# Patient Record
Sex: MALE | Race: WHITE | NOT HISPANIC OR LATINO | Employment: OTHER | URBAN - METROPOLITAN AREA
[De-identification: names, ages, dates, MRNs, and addresses within clinical notes are randomized per-mention and may not be internally consistent; named-entity substitution may affect disease eponyms.]

---

## 2018-01-06 ENCOUNTER — APPOINTMENT (OUTPATIENT)
Dept: GENERAL RADIOLOGY | Facility: CLINIC | Age: 73
End: 2018-01-06
Attending: PHYSICIAN ASSISTANT
Payer: MEDICARE

## 2018-01-06 ENCOUNTER — HOSPITAL ENCOUNTER (EMERGENCY)
Facility: CLINIC | Age: 73
Discharge: HOME | End: 2018-01-06
Attending: PHYSICIAN ASSISTANT | Admitting: PHYSICIAN ASSISTANT
Payer: MEDICARE

## 2018-01-06 VITALS
WEIGHT: 195 LBS | BODY MASS INDEX: 27.92 KG/M2 | SYSTOLIC BLOOD PRESSURE: 149 MMHG | TEMPERATURE: 98.2 F | DIASTOLIC BLOOD PRESSURE: 77 MMHG | OXYGEN SATURATION: 98 % | HEIGHT: 70 IN | RESPIRATION RATE: 18 BRPM | HEART RATE: 58 BPM

## 2018-01-06 DIAGNOSIS — S99.922A SOFT TISSUE INJURY OF LEFT FOOT, INITIAL ENCOUNTER: ICD-10-CM

## 2018-01-06 PROCEDURE — 73630 X-RAY EXAM OF FOOT: CPT | Mod: LT

## 2018-01-06 PROCEDURE — 99283 EMERGENCY DEPT VISIT LOW MDM: CPT

## 2018-01-06 PROCEDURE — 25000132 ZZH RX MED GY IP 250 OP 250 PS 637: Performed by: PHYSICIAN ASSISTANT

## 2018-01-06 RX ORDER — HYDROCODONE BITARTRATE AND ACETAMINOPHEN 5; 325 MG/1; MG/1
2 TABLET ORAL ONCE
Status: COMPLETED | OUTPATIENT
Start: 2018-01-06 | End: 2018-01-06

## 2018-01-06 RX ORDER — HYDROCHLOROTHIAZIDE 12.5 MG/1
12.5 CAPSULE ORAL DAILY
COMMUNITY

## 2018-01-06 RX ORDER — HYDROCODONE BITARTRATE AND ACETAMINOPHEN 5; 325 MG/1; MG/1
1-2 TABLET ORAL EVERY 4 HOURS PRN
Qty: 20 TABLET | Refills: 0 | Status: SHIPPED | OUTPATIENT
Start: 2018-01-06

## 2018-01-06 RX ADMIN — HYDROCODONE BITARTRATE AND ACETAMINOPHEN 2 TABLET: 5; 325 TABLET ORAL at 17:57

## 2018-01-06 ASSESSMENT — ENCOUNTER SYMPTOMS: COLOR CHANGE: 0

## 2018-01-06 NOTE — ED NOTES
Pain to top of left foot that began this am.  NO trauma.  No redness/swelling.  Patient alert and oriented x3.  Airway, breathing and circulation intact.

## 2018-01-06 NOTE — ED AVS SNAPSHOT
[facility] Emergency Department    [address]    [facility] [address]    Phone:  [phone]    Fax:  [phone]                                       Alex York   MRN: [id number]    Department:  [facility] Emergency Department   Date of Visit:  1/6/2018           After Visit Summary Signature Page     I have received my discharge instructions, and my questions have been answered. I have discussed any challenges I see with this plan with the nurse or doctor.    ..........................................................................................................................................  Patient/Patient Representative Signature      ..........................................................................................................................................  Patient Representative Print Name and Relationship to Patient    ..................................................               ................................................  Date                                            Time    ..........................................................................................................................................  Reviewed by Signature/Title    ...................................................              ..............................................  Date                                                            Time

## 2018-01-06 NOTE — ED AVS SNAPSHOT
[facility] Emergency Department    [address]    [facility] [address]    Phone:  [phone]    Fax:  [phone]                                       Alex York   MRN: [id number]    Department:  [facility] Emergency Department   Date of Visit:  1/6/2018           Patient Information     Date Of Birth          1945        Your diagnoses for this visit were:     Soft tissue injury of left foot, initial encounter        You were seen by Cindy Anne PA-C.      Follow-up Information     Follow up with [facility] In 1 week.    Specialties:  Sports Medicine, Pain Management, Obstetrics & Gynecology, Pediatrics, Internal Medicine, Nephrology    Why:  As needed    Contact information:    [address]  [facility] [address] [phone]        Follow up with [facility] In 1 week.    Why:  As needed    Contact information:    [address] [place] [address], [facility] [address]  [facility] [address]  [phone]          Follow up with [facility] Emergency Department.    Specialty:  EMERGENCY MEDICINE    Why:  If symptoms worsen    Contact information:    [address] [facility] [id number] [phone]        Discharge Instructions         Treating Strains and Sprains  Strains and sprains happen when muscles or other soft tissues near your bones stretch or tear. These injuries can cause bruising, swelling, and pain. To ease your discomfort and speed the healing of your strain or sprain, follow the tips below. Remember, a strain or sprain can take 6 to 8 weeks to heal.     Important Note: Do not give aspirin to children or teens without discussing it with your healthcare provider first.        Ice first, heat later    Use ice for the first 24 to 48 hours after injury. Ice helps prevent swelling and reduce pain. Ice the injury for no more than 20 minutes at a time and allow  at least  20 minutes between icing sessions.    Apply heat after the first 72 hours, once the swelling has gone down. Heat relaxes muscles and increases blood flow. Soak the injured area in warm water or use a heating pad set on low for no more than 15 minutes at a time.  Wrap and elevate    Wrap an injured limb firmly with an elastic bandage. This provides support and helps prevent swelling. Don t wear an elastic bandage overnight. Watch for tingling, numbness, or increased pain, and remove the bandage immediately if any of these occurs.    Elevate the injured area to help reduce swelling and throbbing. It s best to raise an injured limb above the level of your heart.     Medicines    Over-the-counter medicines such as acetaminophen or ibuprofen can help reduce pain. Some also help reduce swelling.    Take medicine only as directed.    Rest the area even if medicines are controlling the pain.  Rest    Rest the injured area by not using it for 24 hours.    When you re ready, return slowly to your normal activities. Rest the injured area often.    Don t use or walk on an injured limb if it hurts.  Date Last Reviewed: 9/3/2015    2494-9526 [employer] [address] [place] [address]. All rights reserved. This information is not intended as a substitute for professional medical care. Always follow your healthcare professional's instructions.          24 Hour Appointment Hotline       To make an appointment at any [facility], call [phone] ([phone]). If you don't have a family doctor or clinic, we will help you find one. [facility] clinics are conveniently located to serve the needs of you and your family.             Review of your medicines      START taking        Dose / Directions Last dose taken    HYDROcodone-acetaminophen 5-325 MG per tablet   Commonly known as:  NORCO   Dose:  1-2 tablet   Quantity:  20 tablet        Take 1-2 tablets by mouth every 4 hours as needed for  moderate to severe pain   Refills:  0          Our records show that you are taking the medicines listed below. If these are incorrect, please call your family doctor or clinic.        Dose / Directions Last dose taken    ASPIRIN PO   Dose:  81 mg        Take 81 mg by mouth   Refills:  0        ATENOLOL        Refills:  0        hydrochlorothiazide 12.5 MG capsule   Commonly known as:  MICROZIDE   Dose:  12.5 mg        Take 12.5 mg by mouth daily   Refills:  0        SIMVASTATIN PO        Refills:  0        TRAZODONE HCL PO        Refills:  0                Prescriptions were sent or printed at these locations (1 Prescription)                   Other Prescriptions                Printed at Department/Unit printer (1 of 1)         HYDROcodone-acetaminophen (NORCO) 5-325 MG per tablet                Procedures and tests performed during your visit     Foot XR, G/E 3 views, left      Orders Needing Specimen Collection     None      Pending Results     Date and Time Order Name Status Description    1/6/2018 1755 Foot XR, G/E 3 views, left Preliminary             Pending Culture Results     No orders found from 1/4/2018 to 1/7/2018.            Pending Results Instructions     If you had any lab results that were not finalized at the time of your Discharge, you can call the ED Lab Result RN at [phone]. You will be contacted by this team for any positive Lab results or changes in treatment. The nurses are available 7 days a week from 10A to 6:30P.  You can leave a message 24 hours per day and they will return your call.        Test Results From Your Hospital Stay        1/6/2018  6:26 PM      Narrative     LEFT FOOT THREE OR MORE VIEWS 1/6/2018 6:23 PM     COMPARISON: None.    HISTORY: Pain starting today over 1-2 metatarsals.     FINDINGS: The visualized bones and joint spaces are within normal  limits.        Impression     IMPRESSION: No evidence for fracture, dislocation or significant  degenerative change of the  left foot.                 Clinical Quality Measure: Blood Pressure Screening     Your blood pressure was checked while you were in the emergency department today. The last reading we obtained was  BP: 149/77 . Please read the guidelines below about what these numbers mean and what you should do about them.  If your systolic blood pressure (the top number) is less than 120 and your diastolic blood pressure (the bottom number) is less than 80, then your blood pressure is normal. There is nothing more that you need to do about it.  If your systolic blood pressure (the top number) is 120-139 or your diastolic blood pressure (the bottom number) is 80-89, your blood pressure may be higher than it should be. You should have your blood pressure rechecked within a year by a primary care provider.  If your systolic blood pressure (the top number) is 140 or greater or your diastolic blood pressure (the bottom number) is 90 or greater, you may have high blood pressure. High blood pressure is treatable, but if left untreated over time it can put you at risk for heart attack, stroke, or kidney failure. You should have your blood pressure rechecked by a primary care provider within the next 4 weeks.  If your provider in the emergency department today gave you specific instructions to follow-up with your doctor or provider even sooner than that, you should follow that instruction and not wait for up to 4 weeks for your follow-up visit.        Thank you for choosing [facility]       Thank you for choosing [facility] for your care. Our goal is always to provide you with excellent care. Hearing back from our patients is one way we can continue to improve our services. Please take a few minutes to complete the written survey that you may receive in the mail after you visit with us. Thank you!        [employer] Information     [employer] lets you send messages to your doctor, view your test results, renew your prescriptions, schedule  "appointments and more. To sign up, go to [email or web address] . Click on \"Log in\" on the left side of the screen, which will take you to the Welcome page. Then click on \"Sign up Now\" on the right side of the page.     You will be asked to enter the access code listed below, as well as some personal information. Please follow the directions to create your username and password.     Your access code is: [email or web address]  Expires: 2018  7:03 PM     Your access code will  in 90 days. If you need help or a new code, please call your [facility] clinic or [phone].        Care EveryWhere ID     This is your Care EveryWhere ID. This could be used by other organizations to access your [facility] medical records  [id number]        Equal Access to Services     DANA VALENCIA : Suhail Story, harleen al, celena woods, agnieszka katz. So [facility] [phone].    ATENCIÓN: Si habla español, tiene a melendrez disposición servicios gratuitos de asistencia lingüística. Llame al [phone].    We comply with applicable federal civil rights laws and [place] laws. We do not discriminate on the basis of race, color, national origin, age, disability, sex, sexual orientation, or gender identity.            After Visit Summary       This is your record. Keep this with you and show to your community pharmacist(s) and doctor(s) at your next visit.                  "

## 2018-01-06 NOTE — ED PROVIDER NOTES
"  History     Chief Complaint:  Foot Pain    HPI   Alex York is a 73 year old male who presents to the emergency department today for evaluation of left foot pain and is accompanied by his wife and daughter. The patient reports pain to the top of his left foot as of 1000 this morning that has progressively worsened throughout the day. He denies any injury to the area, but has been remodeling a bathroom at home with repetitive stepping movements up and down a step stool. He is normally able to walk without any problems at baseline, but is unable to walk well now due to pain. He reports some swelling to the area, but no redness, warmth, ankle pain, calf pain, or knee pain. He took 500 mg of Tylenol at home.    Allergies:  Percocet    Medications:    Aspirin  Hydrochlorothiazide  Trazodone  Simvastatin  Atenolol    Past Medical History:    Hypercholesteremia  Hypertension  PTSD    Past Surgical History:    Hip surgery  Back surgery x2    Family History:    History reviewed. No pertinent family history.     Social History:  The patient was accompanied to the ED by his wife and daughter.  Smoking Status: Never smoker  Alcohol Use: Yes  Marital Status:   [2]    Review of Systems   Musculoskeletal:        Left foot pain, swelling. No calf or knee pain.   Skin: Negative for color change.   All other systems reviewed and are negative.    Physical Exam   First Vitals:  BP: 149/77  Pulse: 58  Temp: 98.2  F (36.8  C)  Resp: 18  Height: 177.8 cm (5' 10\")  Weight: 88.5 kg (195 lb)  SpO2: 98 %    Physical Exam  Constitutional: Alert, attentive   CV: 2+ DP and PT pulses, brisk distal cap refill  MSK: There is slight swelling with tenderness to the left foot over the dorsal and volar region of the 2nd metatarsal. Dorsi and plantar flexion cause slight exacerbation of pain. No redness or warmth to area of tenderness. No pain on palpation of the left ankle, toes, lower leg.   Neurological: 5/5 strength to the DF, PF, EHL " and FHL motor functions; sensation intact to the DP, SP, T, S and S distributions  Skin: Skin is warm and dry. No bruising.     Emergency Department Course     Imaging:  Radiology findings were communicated with the patient and family who voiced understanding of the findings.    Foot XR, G/E 3 views, left  No evidence for fracture, dislocation, or significant degenerative change of the left foot.  Reading per radiology    Interventions:  1757 Norco 5-325 2 tablets PO    Emergency Department Course:  Nursing notes and vitals reviewed.  1746: I performed an exam of the patient as documented above.   The patient was sent for a Foot XR, G/E 3 views, left while in the emergency department, results above.   1835: I rechecked the patient and updated him and his family on the results of imaging studies. I discussed the treatment plan with the patient. he expressed understanding of this plan and consented to discharge. He will be discharged home with instructions for care and follow up. In addition, the patient will return to the emergency department if their symptoms worsen, if new symptoms arise or if there is any concern.  All questions were answered prior to discharge.    Impression & Plan      Medical Decision Making:  Alex York is a 73 year old male presents for evaluation of left foot pain over the dorsal and volar region of the 2nd metatarsal.  Signs and symptoms are consistent with a soft tissue injury likely from repetitive movements the past few days while going up and down a step stool working on remodeling the bathroom.  A broad differential was considered including sprain, strain, fracture, tendon rupture, nerve impingement/compromise, referred pain.  X-ray of the foot is negative for fracture, dislocation, or significant degenerative changes of the left foot. Supportive outpatient management is therefore indicated.  He was placed in a cam walker boot, and rest, ice, and elevation treatment was discussed  with the patient. No redness or warmth to the foot or ankle concerning for infection or gout. No calf pain concerning for DVT.    Close follow-up with patient's primary care physician per discharge precautions. I discussed with the patient the possibility of an occult fracture and that if pain persists, to follow up with his primary care provider for further imaging. Contusion discharge instructions given for home.     Diagnosis:    ICD-10-CM    1. Soft tissue injury of left foot, initial encounter S99.922A      Disposition:   Home    Discharge Medications:  New Prescriptions    HYDROCODONE-ACETAMINOPHEN (NORCO) 5-325 MG PER TABLET    Take 1-2 tablets by mouth every 4 hours as needed for moderate to severe pain       Scribe Disclosure:  I, Tanner Joseph, am serving as a scribe at 5:46 PM on 1/6/2018 to document services personally performed by Cindy Anne PA-C, based on my observations and the provider's statements to me.    1/6/2018   [facility] EMERGENCY DEPARTMENT       Cindy Anne PA-C  01/06/18 1744

## 2018-01-07 NOTE — DISCHARGE INSTRUCTIONS
Treating Strains and Sprains  Strains and sprains happen when muscles or other soft tissues near your bones stretch or tear. These injuries can cause bruising, swelling, and pain. To ease your discomfort and speed the healing of your strain or sprain, follow the tips below. Remember, a strain or sprain can take 6 to 8 weeks to heal.     Important Note: Do not give aspirin to children or teens without discussing it with your healthcare provider first.        Ice first, heat later    Use ice for the first 24 to 48 hours after injury. Ice helps prevent swelling and reduce pain. Ice the injury for no more than 20 minutes at a time and allow at least  20 minutes between icing sessions.    Apply heat after the first 72 hours, once the swelling has gone down. Heat relaxes muscles and increases blood flow. Soak the injured area in warm water or use a heating pad set on low for no more than 15 minutes at a time.  Wrap and elevate    Wrap an injured limb firmly with an elastic bandage. This provides support and helps prevent swelling. Don t wear an elastic bandage overnight. Watch for tingling, numbness, or increased pain, and remove the bandage immediately if any of these occurs.    Elevate the injured area to help reduce swelling and throbbing. It s best to raise an injured limb above the level of your heart.     Medicines    Over-the-counter medicines such as acetaminophen or ibuprofen can help reduce pain. Some also help reduce swelling.    Take medicine only as directed.    Rest the area even if medicines are controlling the pain.  Rest    Rest the injured area by not using it for 24 hours.    When you re ready, return slowly to your normal activities. Rest the injured area often.    Don t use or walk on an injured limb if it hurts.  Date Last Reviewed: 9/3/2015    4582-7334 [employer]. [address] [facility], [place] [address]. All rights reserved. This information is not intended as a substitute for  professional medical care. Always follow your healthcare professional's instructions.

## 2020-05-15 ENCOUNTER — RECORDS - HEALTHEAST (OUTPATIENT)
Dept: LAB | Facility: CLINIC | Age: 75
End: 2020-05-15

## 2020-05-18 LAB
COBALT SERPL-MCNC: 8.5 UG/L
CR SERPL-MCNC: 6.9 UG/L

## 2020-05-27 ENCOUNTER — AMBULATORY - HEALTHEAST (OUTPATIENT)
Dept: SURGERY | Facility: CLINIC | Age: 75
End: 2020-05-27

## 2020-05-27 DIAGNOSIS — Z11.59 ENCOUNTER FOR SCREENING FOR OTHER VIRAL DISEASES: ICD-10-CM

## 2020-06-23 ENCOUNTER — RECORDS - HEALTHEAST (OUTPATIENT)
Dept: LAB | Facility: CLINIC | Age: 75
End: 2020-06-23

## 2020-06-23 LAB
ERYTHROCYTE [DISTWIDTH] IN BLOOD BY AUTOMATED COUNT: 12.8 % (ref 11–14.5)
HCT VFR BLD AUTO: 47.4 % (ref 40–54)
HGB BLD-MCNC: 15.4 G/DL (ref 14–18)
MCH RBC QN AUTO: 29.6 PG (ref 27–34)
MCHC RBC AUTO-ENTMCNC: 32.5 G/DL (ref 32–36)
MCV RBC AUTO: 91 FL (ref 80–100)
PLATELET # BLD AUTO: 279 THOU/UL (ref 140–440)
PMV BLD AUTO: 9.1 FL (ref 8.5–12.5)
POTASSIUM BLD-SCNC: 3.5 MMOL/L (ref 3.5–5)
RBC # BLD AUTO: 5.21 MILL/UL (ref 4.4–6.2)
WBC: 8.2 THOU/UL (ref 4–11)

## 2020-06-24 ASSESSMENT — MIFFLIN-ST. JEOR: SCORE: 1608.08

## 2020-06-27 ENCOUNTER — AMBULATORY - HEALTHEAST (OUTPATIENT)
Dept: FAMILY MEDICINE | Facility: CLINIC | Age: 75
End: 2020-06-27

## 2020-06-27 DIAGNOSIS — Z11.59 ENCOUNTER FOR SCREENING FOR OTHER VIRAL DISEASES: ICD-10-CM

## 2020-06-29 ENCOUNTER — AMBULATORY - HEALTHEAST (OUTPATIENT)
Dept: SURGERY | Facility: CLINIC | Age: 75
End: 2020-06-29

## 2020-06-29 DIAGNOSIS — Z11.59 ENCOUNTER FOR SCREENING FOR OTHER VIRAL DISEASES: ICD-10-CM

## 2020-07-13 ENCOUNTER — AMBULATORY - HEALTHEAST (OUTPATIENT)
Dept: FAMILY MEDICINE | Facility: CLINIC | Age: 75
End: 2020-07-13

## 2020-07-13 DIAGNOSIS — Z11.59 ENCOUNTER FOR SCREENING FOR OTHER VIRAL DISEASES: ICD-10-CM

## 2020-07-15 ASSESSMENT — MIFFLIN-ST. JEOR
SCORE: 1608.08
SCORE: 1608.08

## 2020-07-16 ENCOUNTER — ANESTHESIA - HEALTHEAST (OUTPATIENT)
Dept: SURGERY | Facility: CLINIC | Age: 75
End: 2020-07-16

## 2020-07-16 ENCOUNTER — SURGERY - HEALTHEAST (OUTPATIENT)
Dept: SURGERY | Facility: CLINIC | Age: 75
End: 2020-07-16

## 2020-07-16 ASSESSMENT — MIFFLIN-ST. JEOR: SCORE: 1608.99

## 2020-07-21 ENCOUNTER — COMMUNICATION - HEALTHEAST (OUTPATIENT)
Dept: INFECTIOUS DISEASES | Facility: CLINIC | Age: 75
End: 2020-07-21

## 2020-07-22 ENCOUNTER — INFUSION - HEALTHEAST (OUTPATIENT)
Dept: INFUSION THERAPY | Facility: CLINIC | Age: 75
End: 2020-07-22

## 2020-07-22 DIAGNOSIS — Z96.649 FAILED TOTAL HIP ARTHROPLASTY WITH DISLOCATION, INITIAL ENCOUNTER (H): ICD-10-CM

## 2020-07-22 DIAGNOSIS — T84.028A FAILED TOTAL HIP ARTHROPLASTY WITH DISLOCATION, INITIAL ENCOUNTER (H): ICD-10-CM

## 2020-07-22 LAB
ANION GAP SERPL CALCULATED.3IONS-SCNC: 7 MMOL/L (ref 5–18)
BASOPHILS # BLD AUTO: 0.1 THOU/UL (ref 0–0.2)
BASOPHILS NFR BLD AUTO: 1 % (ref 0–2)
BUN SERPL-MCNC: 14 MG/DL (ref 8–28)
C REACTIVE PROTEIN LHE: 4 MG/DL (ref 0–0.8)
CALCIUM SERPL-MCNC: 9.1 MG/DL (ref 8.5–10.5)
CHLORIDE BLD-SCNC: 102 MMOL/L (ref 98–107)
CO2 SERPL-SCNC: 31 MMOL/L (ref 22–31)
CREAT SERPL-MCNC: 0.89 MG/DL (ref 0.7–1.3)
EOSINOPHIL # BLD AUTO: 0.5 THOU/UL (ref 0–0.4)
EOSINOPHIL NFR BLD AUTO: 6 % (ref 0–6)
ERYTHROCYTE [DISTWIDTH] IN BLOOD BY AUTOMATED COUNT: 12.7 % (ref 11–14.5)
GFR SERPL CREATININE-BSD FRML MDRD: >60 ML/MIN/1.73M2
GLUCOSE BLD-MCNC: 108 MG/DL (ref 70–125)
HCT VFR BLD AUTO: 34.7 % (ref 40–54)
HGB BLD-MCNC: 11.5 G/DL (ref 14–18)
LYMPHOCYTES # BLD AUTO: 1.5 THOU/UL (ref 0.8–4.4)
LYMPHOCYTES NFR BLD AUTO: 17 % (ref 20–40)
MCH RBC QN AUTO: 30.3 PG (ref 27–34)
MCHC RBC AUTO-ENTMCNC: 33.1 G/DL (ref 32–36)
MCV RBC AUTO: 91 FL (ref 80–100)
MONOCYTES # BLD AUTO: 0.8 THOU/UL (ref 0–0.9)
MONOCYTES NFR BLD AUTO: 9 % (ref 2–10)
NEUTROPHILS # BLD AUTO: 5.9 THOU/UL (ref 2–7.7)
NEUTROPHILS NFR BLD AUTO: 67 % (ref 50–70)
PLATELET # BLD AUTO: 263 THOU/UL (ref 140–440)
PMV BLD AUTO: 8.5 FL (ref 8.5–12.5)
POTASSIUM BLD-SCNC: 3.2 MMOL/L (ref 3.5–5)
RBC # BLD AUTO: 3.8 MILL/UL (ref 4.4–6.2)
SODIUM SERPL-SCNC: 140 MMOL/L (ref 136–145)
WBC: 8.9 THOU/UL (ref 4–11)

## 2020-07-24 ENCOUNTER — INFUSION - HEALTHEAST (OUTPATIENT)
Dept: INFUSION THERAPY | Facility: HOSPITAL | Age: 75
End: 2020-07-24

## 2020-07-24 DIAGNOSIS — T84.028A FAILED TOTAL HIP ARTHROPLASTY WITH DISLOCATION, INITIAL ENCOUNTER (H): ICD-10-CM

## 2020-07-24 DIAGNOSIS — Z96.649 FAILED TOTAL HIP ARTHROPLASTY WITH DISLOCATION, INITIAL ENCOUNTER (H): ICD-10-CM

## 2020-07-27 ENCOUNTER — INFUSION - HEALTHEAST (OUTPATIENT)
Dept: INFUSION THERAPY | Facility: CLINIC | Age: 75
End: 2020-07-27

## 2020-07-27 DIAGNOSIS — T84.028A FAILED TOTAL HIP ARTHROPLASTY WITH DISLOCATION, INITIAL ENCOUNTER (H): ICD-10-CM

## 2020-07-27 DIAGNOSIS — Z96.649 FAILED TOTAL HIP ARTHROPLASTY WITH DISLOCATION, INITIAL ENCOUNTER (H): ICD-10-CM

## 2020-07-27 LAB
ANION GAP SERPL CALCULATED.3IONS-SCNC: 9 MMOL/L (ref 5–18)
BASOPHILS # BLD AUTO: 0.1 THOU/UL (ref 0–0.2)
BASOPHILS NFR BLD AUTO: 1 % (ref 0–2)
BUN SERPL-MCNC: 18 MG/DL (ref 8–28)
C REACTIVE PROTEIN LHE: 2.2 MG/DL (ref 0–0.8)
CALCIUM SERPL-MCNC: 9.5 MG/DL (ref 8.5–10.5)
CHLORIDE BLD-SCNC: 102 MMOL/L (ref 98–107)
CO2 SERPL-SCNC: 30 MMOL/L (ref 22–31)
CREAT SERPL-MCNC: 0.87 MG/DL (ref 0.7–1.3)
EOSINOPHIL # BLD AUTO: 0.4 THOU/UL (ref 0–0.4)
EOSINOPHIL NFR BLD AUTO: 5 % (ref 0–6)
ERYTHROCYTE [DISTWIDTH] IN BLOOD BY AUTOMATED COUNT: 12.9 % (ref 11–14.5)
GFR SERPL CREATININE-BSD FRML MDRD: >60 ML/MIN/1.73M2
GLUCOSE BLD-MCNC: 123 MG/DL (ref 70–125)
HCT VFR BLD AUTO: 34.4 % (ref 40–54)
HGB BLD-MCNC: 11.6 G/DL (ref 14–18)
LYMPHOCYTES # BLD AUTO: 1.5 THOU/UL (ref 0.8–4.4)
LYMPHOCYTES NFR BLD AUTO: 17 % (ref 20–40)
MCH RBC QN AUTO: 30.8 PG (ref 27–34)
MCHC RBC AUTO-ENTMCNC: 33.7 G/DL (ref 32–36)
MCV RBC AUTO: 91 FL (ref 80–100)
MONOCYTES # BLD AUTO: 0.7 THOU/UL (ref 0–0.9)
MONOCYTES NFR BLD AUTO: 8 % (ref 2–10)
NEUTROPHILS # BLD AUTO: 5.9 THOU/UL (ref 2–7.7)
NEUTROPHILS NFR BLD AUTO: 69 % (ref 50–70)
PLATELET # BLD AUTO: 310 THOU/UL (ref 140–440)
PMV BLD AUTO: 8.3 FL (ref 8.5–12.5)
POTASSIUM BLD-SCNC: 2.9 MMOL/L (ref 3.5–5)
RBC # BLD AUTO: 3.77 MILL/UL (ref 4.4–6.2)
SODIUM SERPL-SCNC: 141 MMOL/L (ref 136–145)
VANCOMYCIN SERPL-MCNC: 17.6 UG/ML
WBC: 8.7 THOU/UL (ref 4–11)

## 2020-07-28 ENCOUNTER — AMBULATORY - HEALTHEAST (OUTPATIENT)
Dept: SURGERY | Facility: CLINIC | Age: 75
End: 2020-07-28

## 2020-07-28 DIAGNOSIS — Z11.59 ENCOUNTER FOR SCREENING FOR OTHER VIRAL DISEASES: ICD-10-CM

## 2020-07-29 ENCOUNTER — INFUSION - HEALTHEAST (OUTPATIENT)
Dept: INFUSION THERAPY | Facility: CLINIC | Age: 75
End: 2020-07-29

## 2020-07-29 DIAGNOSIS — Z96.649 FAILED TOTAL HIP ARTHROPLASTY WITH DISLOCATION, INITIAL ENCOUNTER (H): ICD-10-CM

## 2020-07-29 DIAGNOSIS — T84.028A FAILED TOTAL HIP ARTHROPLASTY WITH DISLOCATION, INITIAL ENCOUNTER (H): ICD-10-CM

## 2020-07-31 ENCOUNTER — INFUSION - HEALTHEAST (OUTPATIENT)
Dept: INFUSION THERAPY | Facility: CLINIC | Age: 75
End: 2020-07-31

## 2020-07-31 DIAGNOSIS — T84.028A FAILED TOTAL HIP ARTHROPLASTY WITH DISLOCATION, INITIAL ENCOUNTER (H): ICD-10-CM

## 2020-07-31 DIAGNOSIS — Z96.649 FAILED TOTAL HIP ARTHROPLASTY WITH DISLOCATION, INITIAL ENCOUNTER (H): ICD-10-CM

## 2020-08-03 ENCOUNTER — INFUSION - HEALTHEAST (OUTPATIENT)
Dept: INFUSION THERAPY | Facility: CLINIC | Age: 75
End: 2020-08-03

## 2020-08-03 DIAGNOSIS — T84.028A FAILED TOTAL HIP ARTHROPLASTY WITH DISLOCATION, INITIAL ENCOUNTER (H): ICD-10-CM

## 2020-08-03 DIAGNOSIS — Z96.649 FAILED TOTAL HIP ARTHROPLASTY WITH DISLOCATION, INITIAL ENCOUNTER (H): ICD-10-CM

## 2020-08-03 LAB
ANION GAP SERPL CALCULATED.3IONS-SCNC: 6 MMOL/L (ref 5–18)
BASOPHILS # BLD AUTO: 0.2 THOU/UL (ref 0–0.2)
BASOPHILS NFR BLD AUTO: 2 % (ref 0–2)
BUN SERPL-MCNC: 17 MG/DL (ref 8–28)
C REACTIVE PROTEIN LHE: 0.7 MG/DL (ref 0–0.8)
CALCIUM SERPL-MCNC: 9.6 MG/DL (ref 8.5–10.5)
CHLORIDE BLD-SCNC: 103 MMOL/L (ref 98–107)
CO2 SERPL-SCNC: 32 MMOL/L (ref 22–31)
CREAT SERPL-MCNC: 0.9 MG/DL (ref 0.7–1.3)
EOSINOPHIL # BLD AUTO: 0.4 THOU/UL (ref 0–0.4)
EOSINOPHIL NFR BLD AUTO: 6 % (ref 0–6)
ERYTHROCYTE [DISTWIDTH] IN BLOOD BY AUTOMATED COUNT: 12.9 % (ref 11–14.5)
GFR SERPL CREATININE-BSD FRML MDRD: >60 ML/MIN/1.73M2
GLUCOSE BLD-MCNC: 119 MG/DL (ref 70–125)
HCT VFR BLD AUTO: 36.4 % (ref 40–54)
HGB BLD-MCNC: 12.2 G/DL (ref 14–18)
LYMPHOCYTES # BLD AUTO: 1.4 THOU/UL (ref 0.8–4.4)
LYMPHOCYTES NFR BLD AUTO: 17 % (ref 20–40)
MCH RBC QN AUTO: 30 PG (ref 27–34)
MCHC RBC AUTO-ENTMCNC: 33.5 G/DL (ref 32–36)
MCV RBC AUTO: 90 FL (ref 80–100)
MONOCYTES # BLD AUTO: 0.7 THOU/UL (ref 0–0.9)
MONOCYTES NFR BLD AUTO: 9 % (ref 2–10)
NEUTROPHILS # BLD AUTO: 5.3 THOU/UL (ref 2–7.7)
NEUTROPHILS NFR BLD AUTO: 66 % (ref 50–70)
PLATELET # BLD AUTO: 353 THOU/UL (ref 140–440)
PMV BLD AUTO: 8.2 FL (ref 8.5–12.5)
POTASSIUM BLD-SCNC: 3.2 MMOL/L (ref 3.5–5)
RBC # BLD AUTO: 4.06 MILL/UL (ref 4.4–6.2)
SODIUM SERPL-SCNC: 141 MMOL/L (ref 136–145)
VANCOMYCIN SERPL-MCNC: 20.1 UG/ML
WBC: 8 THOU/UL (ref 4–11)

## 2020-08-05 ENCOUNTER — INFUSION - HEALTHEAST (OUTPATIENT)
Dept: INFUSION THERAPY | Facility: CLINIC | Age: 75
End: 2020-08-05

## 2020-08-05 DIAGNOSIS — Z96.649 FAILED TOTAL HIP ARTHROPLASTY WITH DISLOCATION, INITIAL ENCOUNTER (H): ICD-10-CM

## 2020-08-05 DIAGNOSIS — T84.028A FAILED TOTAL HIP ARTHROPLASTY WITH DISLOCATION, INITIAL ENCOUNTER (H): ICD-10-CM

## 2020-08-07 ENCOUNTER — INFUSION - HEALTHEAST (OUTPATIENT)
Dept: INFUSION THERAPY | Facility: CLINIC | Age: 75
End: 2020-08-07

## 2020-08-07 DIAGNOSIS — Z96.649 FAILED TOTAL HIP ARTHROPLASTY WITH DISLOCATION, INITIAL ENCOUNTER (H): ICD-10-CM

## 2020-08-07 DIAGNOSIS — T84.028A FAILED TOTAL HIP ARTHROPLASTY WITH DISLOCATION, INITIAL ENCOUNTER (H): ICD-10-CM

## 2020-08-07 LAB
C REACTIVE PROTEIN LHE: 0.6 MG/DL (ref 0–0.8)
CREAT SERPL-MCNC: 1.04 MG/DL (ref 0.7–1.3)
GFR SERPL CREATININE-BSD FRML MDRD: >60 ML/MIN/1.73M2

## 2020-08-10 ENCOUNTER — INFUSION - HEALTHEAST (OUTPATIENT)
Dept: INFUSION THERAPY | Facility: CLINIC | Age: 75
End: 2020-08-10

## 2020-08-10 DIAGNOSIS — T84.028A FAILED TOTAL HIP ARTHROPLASTY WITH DISLOCATION, INITIAL ENCOUNTER (H): ICD-10-CM

## 2020-08-10 DIAGNOSIS — Z96.649 FAILED TOTAL HIP ARTHROPLASTY WITH DISLOCATION, INITIAL ENCOUNTER (H): ICD-10-CM

## 2020-08-10 LAB
ANION GAP SERPL CALCULATED.3IONS-SCNC: 8 MMOL/L (ref 5–18)
BASOPHILS # BLD AUTO: 0.1 THOU/UL (ref 0–0.2)
BASOPHILS NFR BLD AUTO: 2 % (ref 0–2)
BUN SERPL-MCNC: 14 MG/DL (ref 8–28)
C REACTIVE PROTEIN LHE: 0.5 MG/DL (ref 0–0.8)
CALCIUM SERPL-MCNC: 10 MG/DL (ref 8.5–10.5)
CHLORIDE BLD-SCNC: 101 MMOL/L (ref 98–107)
CO2 SERPL-SCNC: 32 MMOL/L (ref 22–31)
CREAT SERPL-MCNC: 1.02 MG/DL (ref 0.7–1.3)
EOSINOPHIL # BLD AUTO: 0.3 THOU/UL (ref 0–0.4)
EOSINOPHIL NFR BLD AUTO: 5 % (ref 0–6)
ERYTHROCYTE [DISTWIDTH] IN BLOOD BY AUTOMATED COUNT: 12.5 % (ref 11–14.5)
GFR SERPL CREATININE-BSD FRML MDRD: >60 ML/MIN/1.73M2
GLUCOSE BLD-MCNC: 98 MG/DL (ref 70–125)
HCT VFR BLD AUTO: 40.1 % (ref 40–54)
HGB BLD-MCNC: 13.2 G/DL (ref 14–18)
LYMPHOCYTES # BLD AUTO: 1.4 THOU/UL (ref 0.8–4.4)
LYMPHOCYTES NFR BLD AUTO: 20 % (ref 20–40)
MCH RBC QN AUTO: 29.8 PG (ref 27–34)
MCHC RBC AUTO-ENTMCNC: 32.9 G/DL (ref 32–36)
MCV RBC AUTO: 91 FL (ref 80–100)
MONOCYTES # BLD AUTO: 0.6 THOU/UL (ref 0–0.9)
MONOCYTES NFR BLD AUTO: 8 % (ref 2–10)
NEUTROPHILS # BLD AUTO: 4.6 THOU/UL (ref 2–7.7)
NEUTROPHILS NFR BLD AUTO: 65 % (ref 50–70)
PLATELET # BLD AUTO: 374 THOU/UL (ref 140–440)
PMV BLD AUTO: 8.3 FL (ref 8.5–12.5)
POTASSIUM BLD-SCNC: 3.3 MMOL/L (ref 3.5–5)
RBC # BLD AUTO: 4.43 MILL/UL (ref 4.4–6.2)
SODIUM SERPL-SCNC: 141 MMOL/L (ref 136–145)
VANCOMYCIN SERPL-MCNC: 23 UG/ML
WBC: 7.1 THOU/UL (ref 4–11)

## 2020-08-12 ENCOUNTER — INFUSION - HEALTHEAST (OUTPATIENT)
Dept: INFUSION THERAPY | Facility: CLINIC | Age: 75
End: 2020-08-12

## 2020-08-12 DIAGNOSIS — Z96.649 FAILED TOTAL HIP ARTHROPLASTY WITH DISLOCATION, INITIAL ENCOUNTER (H): ICD-10-CM

## 2020-08-12 DIAGNOSIS — T84.028A FAILED TOTAL HIP ARTHROPLASTY WITH DISLOCATION, INITIAL ENCOUNTER (H): ICD-10-CM

## 2020-08-14 ENCOUNTER — INFUSION - HEALTHEAST (OUTPATIENT)
Dept: INFUSION THERAPY | Facility: CLINIC | Age: 75
End: 2020-08-14

## 2020-08-14 DIAGNOSIS — Z96.649 FAILED TOTAL HIP ARTHROPLASTY WITH DISLOCATION, INITIAL ENCOUNTER (H): ICD-10-CM

## 2020-08-14 DIAGNOSIS — T84.028A FAILED TOTAL HIP ARTHROPLASTY WITH DISLOCATION, INITIAL ENCOUNTER (H): ICD-10-CM

## 2020-08-14 LAB
CREAT SERPL-MCNC: 1 MG/DL (ref 0.7–1.3)
GFR SERPL CREATININE-BSD FRML MDRD: >60 ML/MIN/1.73M2

## 2020-08-17 ENCOUNTER — INFUSION - HEALTHEAST (OUTPATIENT)
Dept: INFUSION THERAPY | Facility: CLINIC | Age: 75
End: 2020-08-17

## 2020-08-17 DIAGNOSIS — T84.028A FAILED TOTAL HIP ARTHROPLASTY WITH DISLOCATION, INITIAL ENCOUNTER (H): ICD-10-CM

## 2020-08-17 DIAGNOSIS — Z96.649 FAILED TOTAL HIP ARTHROPLASTY WITH DISLOCATION, INITIAL ENCOUNTER (H): ICD-10-CM

## 2020-08-17 LAB
ANION GAP SERPL CALCULATED.3IONS-SCNC: 7 MMOL/L (ref 5–18)
BASOPHILS # BLD AUTO: 0.1 THOU/UL (ref 0–0.2)
BASOPHILS NFR BLD AUTO: 2 % (ref 0–2)
BUN SERPL-MCNC: 16 MG/DL (ref 8–28)
C REACTIVE PROTEIN LHE: 0.3 MG/DL (ref 0–0.8)
CALCIUM SERPL-MCNC: 9.7 MG/DL (ref 8.5–10.5)
CHLORIDE BLD-SCNC: 102 MMOL/L (ref 98–107)
CO2 SERPL-SCNC: 32 MMOL/L (ref 22–31)
CREAT SERPL-MCNC: 1.09 MG/DL (ref 0.7–1.3)
EOSINOPHIL # BLD AUTO: 0.4 THOU/UL (ref 0–0.4)
EOSINOPHIL NFR BLD AUTO: 6 % (ref 0–6)
ERYTHROCYTE [DISTWIDTH] IN BLOOD BY AUTOMATED COUNT: 12.3 % (ref 11–14.5)
GFR SERPL CREATININE-BSD FRML MDRD: >60 ML/MIN/1.73M2
GLUCOSE BLD-MCNC: 105 MG/DL (ref 70–125)
HCT VFR BLD AUTO: 38.7 % (ref 40–54)
HGB BLD-MCNC: 13.1 G/DL (ref 14–18)
LYMPHOCYTES # BLD AUTO: 1.4 THOU/UL (ref 0.8–4.4)
LYMPHOCYTES NFR BLD AUTO: 22 % (ref 20–40)
MCH RBC QN AUTO: 30.3 PG (ref 27–34)
MCHC RBC AUTO-ENTMCNC: 33.9 G/DL (ref 32–36)
MCV RBC AUTO: 90 FL (ref 80–100)
MONOCYTES # BLD AUTO: 0.7 THOU/UL (ref 0–0.9)
MONOCYTES NFR BLD AUTO: 10 % (ref 2–10)
NEUTROPHILS # BLD AUTO: 4 THOU/UL (ref 2–7.7)
NEUTROPHILS NFR BLD AUTO: 60 % (ref 50–70)
PLATELET # BLD AUTO: 237 THOU/UL (ref 140–440)
PMV BLD AUTO: 8.4 FL (ref 8.5–12.5)
POTASSIUM BLD-SCNC: 3.2 MMOL/L (ref 3.5–5)
RBC # BLD AUTO: 4.32 MILL/UL (ref 4.4–6.2)
SODIUM SERPL-SCNC: 141 MMOL/L (ref 136–145)
VANCOMYCIN SERPL-MCNC: 18.7 UG/ML
WBC: 6.7 THOU/UL (ref 4–11)

## 2020-08-18 ENCOUNTER — RECORDS - HEALTHEAST (OUTPATIENT)
Dept: LAB | Facility: CLINIC | Age: 75
End: 2020-08-18

## 2020-08-18 LAB
ANION GAP SERPL CALCULATED.3IONS-SCNC: 10 MMOL/L (ref 5–18)
BUN SERPL-MCNC: 17 MG/DL (ref 8–28)
CALCIUM SERPL-MCNC: 9.8 MG/DL (ref 8.5–10.5)
CHLORIDE BLD-SCNC: 101 MMOL/L (ref 98–107)
CO2 SERPL-SCNC: 32 MMOL/L (ref 22–31)
CREAT SERPL-MCNC: 1.03 MG/DL (ref 0.7–1.3)
GFR SERPL CREATININE-BSD FRML MDRD: >60 ML/MIN/1.73M2
GLUCOSE BLD-MCNC: 83 MG/DL (ref 70–125)
POTASSIUM BLD-SCNC: 3.5 MMOL/L (ref 3.5–5)
SODIUM SERPL-SCNC: 143 MMOL/L (ref 136–145)

## 2020-08-19 ENCOUNTER — INFUSION - HEALTHEAST (OUTPATIENT)
Dept: INFUSION THERAPY | Facility: CLINIC | Age: 75
End: 2020-08-19

## 2020-08-19 DIAGNOSIS — T84.028A FAILED TOTAL HIP ARTHROPLASTY WITH DISLOCATION, INITIAL ENCOUNTER (H): ICD-10-CM

## 2020-08-19 DIAGNOSIS — Z96.649 FAILED TOTAL HIP ARTHROPLASTY WITH DISLOCATION, INITIAL ENCOUNTER (H): ICD-10-CM

## 2020-08-21 ENCOUNTER — INFUSION - HEALTHEAST (OUTPATIENT)
Dept: INFUSION THERAPY | Facility: CLINIC | Age: 75
End: 2020-08-21

## 2020-08-21 DIAGNOSIS — T84.028A FAILED TOTAL HIP ARTHROPLASTY WITH DISLOCATION, INITIAL ENCOUNTER (H): ICD-10-CM

## 2020-08-21 DIAGNOSIS — Z96.649 FAILED TOTAL HIP ARTHROPLASTY WITH DISLOCATION, INITIAL ENCOUNTER (H): ICD-10-CM

## 2020-08-21 LAB
CREAT SERPL-MCNC: 0.97 MG/DL (ref 0.7–1.3)
GFR SERPL CREATININE-BSD FRML MDRD: >60 ML/MIN/1.73M2

## 2020-08-24 ENCOUNTER — INFUSION - HEALTHEAST (OUTPATIENT)
Dept: INFUSION THERAPY | Facility: CLINIC | Age: 75
End: 2020-08-24

## 2020-08-24 DIAGNOSIS — T84.028A FAILED TOTAL HIP ARTHROPLASTY WITH DISLOCATION, INITIAL ENCOUNTER (H): ICD-10-CM

## 2020-08-24 DIAGNOSIS — Z96.649 FAILED TOTAL HIP ARTHROPLASTY WITH DISLOCATION, INITIAL ENCOUNTER (H): ICD-10-CM

## 2020-08-24 LAB
ANION GAP SERPL CALCULATED.3IONS-SCNC: 9 MMOL/L (ref 5–18)
BASOPHILS # BLD AUTO: 0.1 THOU/UL (ref 0–0.2)
BASOPHILS NFR BLD AUTO: 1 % (ref 0–2)
BUN SERPL-MCNC: 14 MG/DL (ref 8–28)
C REACTIVE PROTEIN LHE: 0.2 MG/DL (ref 0–0.8)
CALCIUM SERPL-MCNC: 9.7 MG/DL (ref 8.5–10.5)
CHLORIDE BLD-SCNC: 102 MMOL/L (ref 98–107)
CO2 SERPL-SCNC: 31 MMOL/L (ref 22–31)
CREAT SERPL-MCNC: 1.03 MG/DL (ref 0.7–1.3)
EOSINOPHIL # BLD AUTO: 0.4 THOU/UL (ref 0–0.4)
EOSINOPHIL NFR BLD AUTO: 5 % (ref 0–6)
ERYTHROCYTE [DISTWIDTH] IN BLOOD BY AUTOMATED COUNT: 12.3 % (ref 11–14.5)
GFR SERPL CREATININE-BSD FRML MDRD: >60 ML/MIN/1.73M2
GLUCOSE BLD-MCNC: 121 MG/DL (ref 70–125)
HCT VFR BLD AUTO: 38.2 % (ref 40–54)
HGB BLD-MCNC: 13 G/DL (ref 14–18)
IMM GRANULOCYTES # BLD: 0 THOU/UL
IMM GRANULOCYTES NFR BLD: 0 %
LYMPHOCYTES # BLD AUTO: 1.3 THOU/UL (ref 0.8–4.4)
LYMPHOCYTES NFR BLD AUTO: 18 % (ref 20–40)
MCH RBC QN AUTO: 30.1 PG (ref 27–34)
MCHC RBC AUTO-ENTMCNC: 34 G/DL (ref 32–36)
MCV RBC AUTO: 88 FL (ref 80–100)
MONOCYTES # BLD AUTO: 0.6 THOU/UL (ref 0–0.9)
MONOCYTES NFR BLD AUTO: 9 % (ref 2–10)
NEUTROPHILS # BLD AUTO: 4.9 THOU/UL (ref 2–7.7)
NEUTROPHILS NFR BLD AUTO: 67 % (ref 50–70)
PLATELET # BLD AUTO: 210 THOU/UL (ref 140–440)
PMV BLD AUTO: 8.5 FL (ref 8.5–12.5)
POTASSIUM BLD-SCNC: 3.1 MMOL/L (ref 3.5–5)
RBC # BLD AUTO: 4.32 MILL/UL (ref 4.4–6.2)
SODIUM SERPL-SCNC: 142 MMOL/L (ref 136–145)
VANCOMYCIN SERPL-MCNC: 17.3 UG/ML
WBC: 7.2 THOU/UL (ref 4–11)

## 2020-08-26 ENCOUNTER — OFFICE VISIT - HEALTHEAST (OUTPATIENT)
Dept: INFECTIOUS DISEASES | Facility: CLINIC | Age: 75
End: 2020-08-26

## 2020-08-26 ENCOUNTER — INFUSION - HEALTHEAST (OUTPATIENT)
Dept: INFUSION THERAPY | Facility: CLINIC | Age: 75
End: 2020-08-26

## 2020-08-26 DIAGNOSIS — Z96.649 FAILED TOTAL HIP ARTHROPLASTY WITH DISLOCATION, SUBSEQUENT ENCOUNTER: ICD-10-CM

## 2020-08-26 DIAGNOSIS — T84.028A FAILED TOTAL HIP ARTHROPLASTY WITH DISLOCATION, INITIAL ENCOUNTER (H): ICD-10-CM

## 2020-08-26 DIAGNOSIS — T84.028D FAILED TOTAL HIP ARTHROPLASTY WITH DISLOCATION, SUBSEQUENT ENCOUNTER: ICD-10-CM

## 2020-08-26 DIAGNOSIS — Z96.649 FAILED TOTAL HIP ARTHROPLASTY WITH DISLOCATION, INITIAL ENCOUNTER (H): ICD-10-CM

## 2020-08-26 ASSESSMENT — MIFFLIN-ST. JEOR: SCORE: 1608.08

## 2020-08-27 ENCOUNTER — ANESTHESIA - HEALTHEAST (OUTPATIENT)
Dept: SURGERY | Facility: CLINIC | Age: 75
End: 2020-08-27

## 2020-08-27 ASSESSMENT — MIFFLIN-ST. JEOR: SCORE: 1599.01

## 2020-08-28 ENCOUNTER — INFUSION - HEALTHEAST (OUTPATIENT)
Dept: INFUSION THERAPY | Facility: CLINIC | Age: 75
End: 2020-08-28

## 2020-08-28 ENCOUNTER — AMBULATORY - HEALTHEAST (OUTPATIENT)
Dept: FAMILY MEDICINE | Facility: CLINIC | Age: 75
End: 2020-08-28

## 2020-08-28 DIAGNOSIS — Z11.59 ENCOUNTER FOR SCREENING FOR OTHER VIRAL DISEASES: ICD-10-CM

## 2020-08-28 DIAGNOSIS — Z45.2 PERIPHERALLY INSERTED CENTRAL CATHETER IN PLACE: ICD-10-CM

## 2020-08-30 ENCOUNTER — COMMUNICATION - HEALTHEAST (OUTPATIENT)
Dept: SCHEDULING | Facility: CLINIC | Age: 75
End: 2020-08-30

## 2020-09-01 ENCOUNTER — SURGERY - HEALTHEAST (OUTPATIENT)
Dept: SURGERY | Facility: CLINIC | Age: 75
End: 2020-09-01

## 2020-09-01 ASSESSMENT — MIFFLIN-ST. JEOR
SCORE: 1599.01
SCORE: 1599.01

## 2020-10-13 ENCOUNTER — COMMUNICATION - HEALTHEAST (OUTPATIENT)
Dept: SCHEDULING | Facility: CLINIC | Age: 75
End: 2020-10-13

## 2021-05-26 VITALS
HEART RATE: 55 BPM | OXYGEN SATURATION: 97 % | TEMPERATURE: 97.7 F | SYSTOLIC BLOOD PRESSURE: 149 MMHG | DIASTOLIC BLOOD PRESSURE: 69 MMHG

## 2021-05-26 VITALS
TEMPERATURE: 98.1 F | OXYGEN SATURATION: 95 % | HEART RATE: 57 BPM | SYSTOLIC BLOOD PRESSURE: 136 MMHG | DIASTOLIC BLOOD PRESSURE: 64 MMHG

## 2021-05-26 VITALS
HEART RATE: 51 BPM | TEMPERATURE: 98 F | SYSTOLIC BLOOD PRESSURE: 137 MMHG | OXYGEN SATURATION: 97 % | DIASTOLIC BLOOD PRESSURE: 65 MMHG

## 2021-05-26 VITALS
TEMPERATURE: 97.9 F | OXYGEN SATURATION: 98 % | SYSTOLIC BLOOD PRESSURE: 138 MMHG | HEART RATE: 62 BPM | DIASTOLIC BLOOD PRESSURE: 66 MMHG

## 2021-05-26 VITALS
SYSTOLIC BLOOD PRESSURE: 135 MMHG | HEART RATE: 58 BPM | DIASTOLIC BLOOD PRESSURE: 95 MMHG | TEMPERATURE: 98.1 F | OXYGEN SATURATION: 96 %

## 2021-05-27 VITALS
HEART RATE: 57 BPM | TEMPERATURE: 98 F | DIASTOLIC BLOOD PRESSURE: 74 MMHG | OXYGEN SATURATION: 95 % | SYSTOLIC BLOOD PRESSURE: 156 MMHG

## 2021-05-27 VITALS
DIASTOLIC BLOOD PRESSURE: 72 MMHG | TEMPERATURE: 98 F | SYSTOLIC BLOOD PRESSURE: 137 MMHG | HEART RATE: 58 BPM | OXYGEN SATURATION: 98 %

## 2021-05-27 VITALS
DIASTOLIC BLOOD PRESSURE: 69 MMHG | HEART RATE: 57 BPM | TEMPERATURE: 98 F | SYSTOLIC BLOOD PRESSURE: 146 MMHG | OXYGEN SATURATION: 95 %

## 2021-05-27 VITALS
TEMPERATURE: 98.6 F | HEART RATE: 64 BPM | OXYGEN SATURATION: 96 % | SYSTOLIC BLOOD PRESSURE: 141 MMHG | DIASTOLIC BLOOD PRESSURE: 73 MMHG

## 2021-05-27 VITALS
SYSTOLIC BLOOD PRESSURE: 145 MMHG | TEMPERATURE: 98.4 F | DIASTOLIC BLOOD PRESSURE: 65 MMHG | HEART RATE: 65 BPM | OXYGEN SATURATION: 97 %

## 2021-05-27 VITALS
HEART RATE: 61 BPM | TEMPERATURE: 98.8 F | DIASTOLIC BLOOD PRESSURE: 61 MMHG | OXYGEN SATURATION: 96 % | RESPIRATION RATE: 18 BRPM | SYSTOLIC BLOOD PRESSURE: 128 MMHG

## 2021-05-27 VITALS
DIASTOLIC BLOOD PRESSURE: 70 MMHG | HEART RATE: 58 BPM | OXYGEN SATURATION: 96 % | TEMPERATURE: 97.7 F | SYSTOLIC BLOOD PRESSURE: 131 MMHG

## 2021-05-27 VITALS
DIASTOLIC BLOOD PRESSURE: 65 MMHG | HEART RATE: 53 BPM | SYSTOLIC BLOOD PRESSURE: 140 MMHG | OXYGEN SATURATION: 96 % | TEMPERATURE: 98.8 F

## 2021-05-27 VITALS
HEART RATE: 55 BPM | OXYGEN SATURATION: 98 % | DIASTOLIC BLOOD PRESSURE: 66 MMHG | SYSTOLIC BLOOD PRESSURE: 139 MMHG | TEMPERATURE: 97.9 F

## 2021-05-27 VITALS
HEART RATE: 56 BPM | OXYGEN SATURATION: 96 % | DIASTOLIC BLOOD PRESSURE: 62 MMHG | TEMPERATURE: 98.2 F | SYSTOLIC BLOOD PRESSURE: 135 MMHG

## 2021-05-27 VITALS — SYSTOLIC BLOOD PRESSURE: 146 MMHG | TEMPERATURE: 98.1 F | HEART RATE: 58 BPM | DIASTOLIC BLOOD PRESSURE: 70 MMHG

## 2021-05-27 VITALS — DIASTOLIC BLOOD PRESSURE: 70 MMHG | HEART RATE: 61 BPM | SYSTOLIC BLOOD PRESSURE: 147 MMHG | OXYGEN SATURATION: 97 %

## 2021-06-04 VITALS — HEIGHT: 70 IN | BODY MASS INDEX: 27.23 KG/M2 | WEIGHT: 190.2 LBS

## 2021-06-05 VITALS — WEIGHT: 188 LBS | HEIGHT: 70 IN | BODY MASS INDEX: 26.92 KG/M2

## 2021-06-09 NOTE — PROGRESS NOTES
Pt amb into clinic for vanco via CADD pump. Discussed pump, including who and when to call if problems; hand-out given. Pt educated on importance of controlling pain, using prophylactic probiotics or yogurt, bathing precautions with pump. Discussed lab schedule with [facility]; hem, BMP and CRP drawn today, will be scheduled for drawing along with vanco trough on Monday 7-27. Pump programmed to dose at 0945 and 2145. Pt scheduled at [facility] for pump refill 7-24.

## 2021-06-09 NOTE — ANESTHESIA PROCEDURE NOTES
Spinal Block    Patient location during procedure: OR  Start time: 7/16/2020 2:07 PM  End time: 7/16/2020 2:11 PM  Reason for block: primary anesthetic    Staffing:  Performing  Anesthesiologist: Jalyn Miles MD    Preanesthetic Checklist  Completed: patient identified, risks, benefits, and alternatives discussed, timeout performed, consent obtained, airway assessed, oxygen available, suction available, emergency drugs available and hand hygiene performed  Spinal Block  Patient position: sitting  Prep: ChloraPrep  Patient monitoring: heart rate, continuous pulse ox and blood pressure  Approach: midline  Location: L3-4  Injection technique: single-shot  Needle type: pencil-tip   Needle gauge: 24 G      Additional Notes:  R/B/A discussed with pt who wished to proceed. Three passes for CSF aspiration. Negative for heme. No parasthesias. Pt tolerated procedure well. No complications.

## 2021-06-09 NOTE — ANESTHESIA CARE TRANSFER NOTE
Last vitals:   Vitals:    07/16/20 1740   BP: 142/70   Pulse: 63   Resp: 22   Temp: (!) 35.7  C (96.3  F)   SpO2: 98%     Patient's level of consciousness is drowsy  Spontaneous respirations: yes  Maintains airway independently: yes  Dentition unchanged: yes  Oropharynx: nasal airway in place    QCDR Measures:  ASA# 20 - Surgical Safety Checklist: WHO surgical safety checklist completed prior to induction    PQRS# 430 - Adult PONV Prevention: 4558F - Pt received => 2 anti-emetic agents (different classes) preop & intraop  ASA# 8 - Peds PONV Prevention: 4558F - Pt received => 2 anti-emetic agents (different classes) preop & intraop  PQRS# 424 - Irene-op Temp Management: 4559F - At least one body temp DOCUMENTED => 35.5C or 95.9F within required timeframe  PQRS# 426 - PACU Transfer Protocol: - Transfer of care checklist used  ASA# 14 - Acute Post-op Pain: ASA14B - Patient did NOT experience pain >= 7 out of 10

## 2021-06-09 NOTE — TELEPHONE ENCOUNTER
----- Message from Nolan Henderson MD sent at 7/21/2020 10:19 AM CDT -----  Please set up follow up with me on 8/26/20, video visit.    Thank you,    Nolan Henderson

## 2021-06-09 NOTE — ANESTHESIA PREPROCEDURE EVALUATION
Anesthesia Evaluation      Patient summary reviewed   No history of anesthetic complications     Airway   Mallampati: II  Neck ROM: full   Pulmonary - normal exam   (+) COPD moderate, sleep apnea on CPAP, ,                          Cardiovascular - normal exam  (+) hypertension, ,      Neuro/Psych    (+) depression, anxiety/panic attacks,     Endo/Other    (+) arthritis,      GI/Hepatic/Renal - negative ROS           Dental - normal exam                        Anesthesia Plan  Planned anesthetic: spinal  Propofol gtt for sedation  ASA 2   Induction: intravenous   Anesthetic plan and risks discussed with: patient    Post-op plan: routine recovery

## 2021-06-09 NOTE — TELEPHONE ENCOUNTER
Date: 8/26/2020 Status: [facility]   Time: 12:00 PM Length: 30   Visit Type: VIDEO VISIT RETURN [1703] Copay: $0.00   Provider: Nolan Henderson MD

## 2021-06-09 NOTE — ANESTHESIA POSTPROCEDURE EVALUATION
Patient: Alex York  Procedure(s):  RIGHT HIP VEGA RESURFACING EXPLANT AND PLACEMENT PROSTALAC RIGHT HIP (Right)  Anesthesia type: spinal    Patient location: PACU  Last vitals:   Vitals Value Taken Time   /63 7/16/2020  7:35 PM   Temp 36.2  C (97.2  F) 7/16/2020  7:35 PM   Pulse 90 7/16/2020  7:35 PM   Resp 24 7/16/2020  7:35 PM   SpO2 90 % 7/16/2020  7:35 PM     Post vital signs: stable  Level of consciousness: awake, confused and responds to simple questions  Post-anesthesia pain: pain controlled  Post-anesthesia nausea and vomiting: no  Pulmonary: unassisted, return to baseline, nasal cannula   Cardiovascular: stable and blood pressure at baseline  Hydration: adequate  Anesthetic events: no    QCDR Measures:  ASA# 11 - Irene-op Cardiac Arrest: ASA11B - Patient did NOT experience unanticipated cardiac arrest  ASA# 12 - Irene-op Mortality Rate: ASA12B - Patient did NOT die  ASA# 13 - PACU Re-Intubation Rate: ASA13B - Patient did NOT require a new airway mgmt  ASA# 10 - Composite Anes Safety: ASA10A - No serious adverse event    Additional Notes:

## 2021-06-09 NOTE — PROGRESS NOTES
0910 Alex arrived A&Ox4 ambulatory with his walker and stable. Pt confirms he is here for vanco pump exchange. He is not due for vanco trough/ labs today. PICC site care is due Monday 7/27. Alex states he did not have any issues with the pump.  Pump stopped and disconnected; pt had excellent blood return from PICC and line was easily flushed NS.  1050 pump was connected/programmed and started per orders; confirmed pump was infusing.  Alex dc'd A&Ox4 ambulatory with his walker and stable. His remaining pump exchanges are at [facility].

## 2021-06-10 NOTE — PROGRESS NOTES
Pt arrives ambulatory with walker. Pt reports doing well. Vanco infusion complete. Pt reports no problems with the infusion pump. PICC shows good blood return, labs drawn, and flushed. PICC line dressing changed, and site looks good. Vanco level shows 17.3, and proceeding with 4g Vanco portable pump infusion -programmed per order, and double checked with Daron SIEGEL. Pt left department ambulatory with walker.

## 2021-06-10 NOTE — PROGRESS NOTES
Pt arrives ambulatory with walker to Infusion dept. Pt reports no problem with pump over the weekend; pump shows complete infusion. Pt reports pain 5/10 in right hip. Pt reports bowels WDL. VSS. Labs drawn peripherally. PICC shows good blood return, flushed, and dressing changed. Vanco pump set-up and double checked with Lisa SIEGEL. Pt left department ambulatory with walker.

## 2021-06-10 NOTE — PROGRESS NOTES
.Pt arrived to infusion clinic. Pt disconnected from abx pump. PICC line flushed with saline. PICC line removed per Dr. Henderson. Pt ambulated out of infusion independently.

## 2021-06-10 NOTE — PROGRESS NOTES
Pt called approx 1500 to say his abx bag was leaking. Pt returned to clinic approx 1615 with damp carrying bag. Pt reports large, clear, clamp on IV bag was not secure, pt actually slid clamp in attempt to close off leak. New vanco bag attached, still programmed to dose at 2130. Pt to return to clinic on Monday.

## 2021-06-10 NOTE — PROGRESS NOTES
Pharmacy Consult: Vancomycin Dosing    Pharmacist consulted to dose vancomycin for Alex York, a 75 y.o. male.    Ordering provider: Dr. Henderson    Indication for vancomycin therapy: Bone and/or Joint Infection    Goal Trough Range:  15-20 mcg/mL based on indication    Other current antimicrobials              vancomycin 4 g in dextrose 5% 400 mL CADD pump  CADD Intermittent                   Subjective/Objective:    Patient was admitted for [unfilled] on (Not on file)    Height:      Actual Body Weight (ABW):      Patient weight not recorded    BMI: There is no height or weight on file to calculate BMI.    Allergies   Allergen Reactions     Percocet [Oxycodone-Acetaminophen] Other (See Comments)     Hallucinations, flashbacks to [place]     Other Allergy (See Comments) Hives     IV Contrast -pt unsure what particular contrast.       Patient Active Problem List   Diagnosis     Failed total hip arthroplasty with dislocation, initial encounter (H)     PTSD (post-traumatic stress disorder)     MICHELLE (obstructive sleep apnea)     Essential hypertension     HLD (hyperlipidemia)     Anemia due to blood loss, acute    Past Medical History:   Diagnosis Date     Arthritis      COPD (chronic obstructive pulmonary disease) (H)      Depression      High cholesterol      Hypertension      PTSD (post-traumatic stress disorder)     combat medic in [place]     PTSD (post-traumatic stress disorder)      Sleep apnea         Temp Readings from Current Encounter:     08/10/20 0741   Temp: 97.9  F (36.6  C)     Net Intake/Output (last 24 hours):  [unfilled]    Recent Labs     08/10/20  0735   WBC 7.1   NEUTROABS 4.6   CRP 0.5   BUN 14   CREATININE 1.02     CrCl cannot be calculated (Unknown ideal weight.).    No results for input(s): CULTURE in the last 72 hours.    No results found for any visits on 08/10/20.    Recent labs: (last 7 days)     08/10/20  0735   VANCOMYCIN 23.0       Vancomycin administrations: (last 120  hours)     None          Assessment/Plan:    Pharmacist consulted to dose vancomycin for Bone and/or Joint Infection, goal trough range 15-20 mcg/mL.  1. Decrease vancomycin to 1000 mg IV every 12 hours.  2. Trough level of 23 was drawn 1 hour early - estimated trough to be 21.7.  This level was drawn at steady state  3. Pharmacist will plan to check a vancomycin trough level in 1 week.  4. Pharmacist will continue to follow.    Thank you for the consult.  Gunner Bella, PharmD 8/10/2020 9:29 AM

## 2021-06-10 NOTE — PROGRESS NOTES
/65   Pulse (!) 51   Temp 98  F (36.7  C) (Oral)   SpO2 97%     Patient presented for IV antibiotics pump refill. PICC flushed, blood return noted and dressing changed.  Labs sent. Vancomycin pump set up. Left clinic independently with FWW. To follow-up Wednesday for next pump refill.

## 2021-06-10 NOTE — PROGRESS NOTES
Pharmacy Consult: Vancomycin Dosing    Pharmacist consulted to dose vancomycin for Alex York, a 75 y.o. male.    Ordering provider: Dr. Henderson    Indication for vancomycin therapy: Bone and/or Joint Infection    Goal Trough Range:  15-20 mcg/mL based on indication    Other current antimicrobials              vancomycin 4 g in dextrose 5% 400 mL CADD pump  CADD Intermittent                   Subjective/Objective:    Patient was admitted for [unfilled] on (Not on file)    Height:      Actual Body Weight (ABW):      Patient weight not recorded    BMI: There is no height or weight on file to calculate BMI.    Allergies   Allergen Reactions     Percocet [Oxycodone-Acetaminophen] Other (See Comments)     Hallucinations, flashbacks to [place]     Other Allergy (See Comments) Hives     IV Contrast -pt unsure what particular contrast.       Patient Active Problem List   Diagnosis     Failed total hip arthroplasty with dislocation, initial encounter (H)     PTSD (post-traumatic stress disorder)     MICHELLE (obstructive sleep apnea)     Essential hypertension     HLD (hyperlipidemia)     Anemia due to blood loss, acute    Past Medical History:   Diagnosis Date     Arthritis      COPD (chronic obstructive pulmonary disease) (H)      Depression      High cholesterol      Hypertension      PTSD (post-traumatic stress disorder)     combat medic in [place]     PTSD (post-traumatic stress disorder)      Sleep apnea         Temp Readings from Current Encounter:     08/17/20 0740   Temp: 98  F (36.7  C)     Net Intake/Output (last 24 hours):  [unfilled]    Recent Labs     08/17/20  0737   WBC 6.7   NEUTROABS 4.0   CRP 0.3   BUN 16   CREATININE 1.09     CrCl cannot be calculated (Unknown ideal weight.).    No results for input(s): CULTURE in the last 72 hours.    No results found for any visits on 08/17/20.    Recent labs: (last 7 days)     08/17/20  0737   VANCOMYCIN 18.7       Vancomycin administrations: (last 120  hours)     None          Assessment/Plan:    Pharmacist consulted to dose vancomycin for Bone and/or Joint Infection, goal trough range 15-20 mcg/mL.  1. Continue vancomycin 1000 mg IV every 12 hours (11.6 mg/kg actual body weight).  2. Vancomycin trough level of 18.7 mcg/mL was within the goal trough range. This trough level was drawn at steady state. Trough was drawn 1.5 hours early.  Extrapolated trough would be 16.7 mcg/ml.  3. Pharmacist will plan to re-check a vancomycin trough level in one week.  4. Pharmacist will continue to follow.    Thank you for the consult.  Nohemi Rojas, PharmD 8/17/2020 8:19 AM

## 2021-06-10 NOTE — PROGRESS NOTES
Pharmacy Consult: Vancomycin Dosing    Pharmacist consulted to dose vancomycin for Alex York, a 75 y.o. male.    Ordering provider: Dr. Henderson    Indication for vancomycin therapy: Bone and/or Joint Infection    Goal Trough Range:  15-20 mcg/mL based on indication    Other current antimicrobials              vancomycin 5 g in dextrose 5% 500 mL CADD pump  CADD Intermittent                   Subjective/Objective:    Patient was admitted for [unfilled] on (Not on file)    Height:      Actual Body Weight (ABW):      Ideal body weight: 73 kg (160 lb 15 oz)  Adjusted ideal body weight: 78.3 kg (172 lb 10.3 oz)    BMI: There is no height or weight on file to calculate BMI.    Allergies   Allergen Reactions     Percocet [Oxycodone-Acetaminophen] Other (See Comments)     Hallucinations, flashbacks to [place]     Other Allergy (See Comments) Hives     IV Contrast -pt unsure what particular contrast.       Patient Active Problem List   Diagnosis     Failed total hip arthroplasty with dislocation, initial encounter (H)     PTSD (post-traumatic stress disorder)     MICHELLE (obstructive sleep apnea)     Essential hypertension     HLD (hyperlipidemia)     Anemia due to blood loss, acute    Past Medical History:   Diagnosis Date     Arthritis      COPD (chronic obstructive pulmonary disease) (H)      Depression      High cholesterol      Hypertension      PTSD (post-traumatic stress disorder)     combat medic in [place]     PTSD (post-traumatic stress disorder)      Sleep apnea         Temp Readings from Current Encounter:     08/03/20 0746   Temp: 98.6  F (37  C)     Net Intake/Output (last 24 hours):  [unfilled]    Recent Labs     08/03/20  0746   WBC 8.0   NEUTROABS 5.3   CRP 0.7   BUN 17   CREATININE 0.90     Estimated Creatinine Clearance: 89.2 mL/min (by C-G formula based on SCr of 0.9 mg/dL).    No results for input(s): CULTURE in the last 72 hours.    No results found for any visits on 08/03/20.    Recent  labs: (last 7 days)     08/03/20  0746   VANCOMYCIN 20.1       Vancomycin administrations: (last 120 hours)     None          Assessment/Plan:    Pharmacist consulted to dose vancomycin for Bone and/or Joint Infection, goal trough range 15-20 mcg/mL.  1. Continue vancomycin 1250 mg IV every 12 hours (14 mg/kg actual body weight) via CADD pump.  2. Vancomycin trough level of 20.1 mcg/mL was above the goal trough range. This trough level was drawn at steady state.  Trough is just above goal.  This trough was drawn 1.75 hours early, true trough would be within goal range of 15-20 mcg/ml.  Serum creatinine level remains stable.  3. Pharmacist will plan to re-check a vancomycin trough level in one week.  4. Pharmacist will continue to follow.    Thank you for the consult.  Nohemi Rojas, PharmD 8/3/2020 8:50 AM

## 2021-06-10 NOTE — PROGRESS NOTES
Pt tolerated CADD pump refill well, dose programmed to start 0930. CADD pump double-checked by Sabrina RYAN

## 2021-06-10 NOTE — PROGRESS NOTES
"Alex York is a 75 y.o. male who is being evaluated via a billable video visit.      The patient has been notified of following:     \"This video visit will be conducted via a call between you and your physician/provider. We have found that certain health care needs can be provided without the need for an in-person physical exam.  This service lets us provide the care you need with a video conversation.  If a prescription is necessary we can send it directly to your pharmacy.  If lab work is needed we can place an order for that and you can then stop by our lab to have the test done at a later time.    Video visits are billed at different rates depending on your insurance coverage. Please reach out to your insurance provider with any questions.    If during the course of the call the physician/provider feels a video visit is not appropriate, you will not be charged for this service.\"    Patient has given verbal consent to a Video visit? Yes  How would you like to obtain your AVS? AVS Preference: Mail a copy.  If dropped by the video visit, the video invitation should be sent to: Text to cell phone: [phone]  Will anyone else be joining your video visit? 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     He had persistent pain and discomfort and occasional popping of the right hip.     On 7/16/20 he underwent removal of prosthesis and placement of a Prostalac spacer.     Nothing grew on cultures however the operative findings did not look consistent with solely metallosis, therefore we opted to treat with a course of vancomycin for suspected infection.    He is tolerating treatment, he is getting vancomycin through  infusion center.     Exam:  No distress  PICC in place  Breathing comfortably  Hip not examined.  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  Cultures no growth with broth having been held for 2 weeks.  Gram stain with no PMN and no organisms.  Normal CRP preop. Cannot say conclusively whether this was infection or not.   Now nearly finished with 6 weeks post-op IV vancomycin.   Discussed with Dr. Burns when he was in the hospital -- operative findings were out of the ordinary compared to what is typically seen with metallosis alone -- typically fluid is grey in color, and this was white, looked like pus. Therefore there was concern for infection despite other findings. We discussed setting him up for re-implant at the end of antibiotic course, and he is scheduled for this next week. We can continue with that plan as things appear to be going well.      Recommendations:  Vancomycin plan for 6 weeks to be completed 8/28 am then remove PICC at  infusion center.  I left message with Dr. Burns after the visit with plans to proceed. We can discuss if needed.   Return as needed.     Video-Visit Details    Type of service:  Video Visit    Video End Time (time video stopped): 12:28pm  Originating Location (pt. Location): Home    Distant Location (provider location):   [facility]     Platform used for Video Visit: Letha Henderson MD

## 2021-06-10 NOTE — PATIENT INSTRUCTIONS - HE
Last dose of vancomycin is 8/28/20 then can remove PICC that day.     Surgery scheduled for next week.     Please let me know if you have further concerns or questions.     Nolan Henderson

## 2021-06-10 NOTE — PROGRESS NOTES
Pt tolerated vanco via CADD pump refill well. Labs drawn - stable. He left ambulatory per self with a walker. Sabrina Lu

## 2021-06-10 NOTE — PROGRESS NOTES
/74   Pulse (!) 57   Temp 98  F (36.7  C) (Oral)   SpO2 95%     Patient presented for antibiotic pump refill. PICC flushed and blood return noted. Vancomycin pump set-up. Left clinic independently with FWW. To follow-up Friday for next pump refill.

## 2021-06-10 NOTE — PROGRESS NOTES
Pharmacy Consult: Vancomycin Dosing    Pharmacist consulted to dose vancomycin for Alex York, a 75 y.o. male.    Ordering provider: Dr. Henderson     Indication for vancomycin therapy: Bone and/or Joint Infection    Goal Trough Range:  15-20 mcg/mL based on indication    Other current antimicrobials              vancomycin 5 g in dextrose 5% 500 mL CADD pump  CADD Intermittent                   Subjective/Objective:    Patient was admitted for [unfilled] on (Not on file)    Height:      Actual Body Weight (ABW):      Ideal body weight: 73 kg (160 lb 15 oz)  Adjusted ideal body weight: 78.3 kg (172 lb 10.3 oz)    BMI: There is no height or weight on file to calculate BMI.    Allergies   Allergen Reactions     Percocet [Oxycodone-Acetaminophen] Other (See Comments)     Hallucinations, flashbacks to [place]       Patient Active Problem List   Diagnosis     Failed total hip arthroplasty with dislocation, initial encounter (H)     PTSD (post-traumatic stress disorder)     MICHELLE (obstructive sleep apnea)     Essential hypertension     HLD (hyperlipidemia)     Anemia due to blood loss, acute    Past Medical History:   Diagnosis Date     Arthritis      COPD (chronic obstructive pulmonary disease) (H)      Depression      High cholesterol      Hypertension      PTSD (post-traumatic stress disorder)     combat medic in [place]     PTSD (post-traumatic stress disorder)      Sleep apnea         Temp Readings from Current Encounter:     07/27/20 0813   Temp: 98.1  F (36.7  C)     Net Intake/Output (last 24 hours):  [unfilled]    Recent Labs     07/27/20  0730   WBC 8.7   NEUTROABS 5.9   CRP 2.2*   BUN 18   CREATININE 0.87     Estimated Creatinine Clearance: 92.3 mL/min (by C-G formula based on SCr of 0.87 mg/dL).    No results for input(s): CULTURE in the last 72 hours.    No results found for any visits on 07/27/20.    Recent labs: (last 7 days)     07/21/20  1228 07/27/20  0730   VANCOMYCIN 15.1 17.6        Vancomycin administrations: (last 120 hours)     None          Assessment/Plan:    Pharmacist consulted to dose vancomycin for Bone and/or Joint Infection, goal trough range 15-20 mcg/mL.  1. Continue vancomycin 1250mg IV every 12 hours via CADD pump  2. Vancomycin trough level of 17.2 mcg/mL was within the goal trough range. This trough level was drawn at steady state.   3. Trough was drawn 2 hours. Extrapolated trough would be 15.1mcg/mL. Still within goal.  4. Pharmacist will plan to re-check a vancomycin trough level in one week.  5. Pharmacist will continue to follow.    Thank you for the consult.  Pito Vang, PharmD 7/27/2020 8:49 AM

## 2021-06-10 NOTE — PROGRESS NOTES
Pharmacy Consult: Vancomycin Dosing    Pharmacist consulted to dose vancomycin for Alex York, a 75 y.o. male.    Ordering provider: Dr. Henderson    Indication for vancomycin therapy: Bone and/or Joint Infection    Goal Trough Range:  15-20 mcg/mL based on indication    Other current antimicrobials              vancomycin 4 g in dextrose 5% 400 mL CADD pump  CADD Intermittent                   Subjective/Objective:    Patient was admitted for [unfilled] on (Not on file)    Height:      Actual Body Weight (ABW):      Patient weight not recorded    BMI: There is no height or weight on file to calculate BMI.    Allergies   Allergen Reactions     Percocet [Oxycodone-Acetaminophen] Other (See Comments)     Hallucinations, flashbacks to [place]     Other Allergy (See Comments) Hives     IV Contrast -pt unsure what particular contrast.       Patient Active Problem List   Diagnosis     Failed total hip arthroplasty with dislocation, initial encounter (H)     PTSD (post-traumatic stress disorder)     MICHELLE (obstructive sleep apnea)     Essential hypertension     HLD (hyperlipidemia)     Anemia due to blood loss, acute    Past Medical History:   Diagnosis Date     Arthritis      COPD (chronic obstructive pulmonary disease) (H)      Depression      High cholesterol      Hypertension      PTSD (post-traumatic stress disorder)     combat medic in [place]     PTSD (post-traumatic stress disorder)      Sleep apnea         Temp Readings from Current Encounter:     08/24/20 0700   Temp: 98  F (36.7  C)     Net Intake/Output (last 24 hours):  [unfilled]    Recent Labs     08/24/20  0741   WBC 7.2   NEUTROABS 4.9   CRP 0.2   BUN 14   CREATININE 1.03     CrCl cannot be calculated (Unknown ideal weight.).    No results for input(s): CULTURE in the last 72 hours.    No results found for any visits on 08/24/20.    Recent labs: (last 7 days)     08/24/20  0741   VANCOMYCIN 17.3       Vancomycin administrations: (last 120  hours)     None          Assessment/Plan:    Pharmacist consulted to dose vancomycin for Bone and/or Joint Infection, goal trough range 15-20 mcg/mL.  1. Continue vancomycin 1000 mg IV every 12 hours (11.6 mg/kg actual body weight).  2. Vancomycin trough level of 17.3 mcg/mL was within the goal trough range. This trough level was drawn at steady state. Trough was drawn 2 hours early.  3. Pharmacist will plan to check a vancomycin trough level in one week if vancomycin is to continue.  4. Pharmacist will continue to follow.    Thank you for the consult.  Nohemi Rojas, PharmD 8/24/2020 8:41 AM

## 2021-06-11 NOTE — ANESTHESIA PROCEDURE NOTES
Spinal Block    Patient location during procedure: OR  Start time: 9/1/2020 2:21 PM  End time: 9/1/2020 2:24 PM  Reason for block: at surgeon's request and primary anesthetic    Staffing:  Performing  Anesthesiologist: Rakesh Zepeda MD    Preanesthetic Checklist  Completed: patient identified, risks, benefits, and alternatives discussed, timeout performed, consent obtained, at patient's request, airway assessed, oxygen available, suction available, emergency drugs available and hand hygiene performed  Spinal Block  Patient position: sitting  Prep: ChloraPrep  Patient monitoring: heart rate, cardiac monitor, continuous pulse ox and blood pressure  Approach: midline  Location: L3-4  Injection technique: single-shot  Needle type: pencil-tip   Needle gauge: 24 G

## 2021-06-11 NOTE — ANESTHESIA POSTPROCEDURE EVALUATION
Patient: Alex York  Procedure(s):  RIGHT TOTAL HIP ARTHROPLASTY RE-IMPLANTATION (Right)  Anesthesia type: spinal    Patient location: PACU  Last vitals:   Vitals Value Taken Time   /72 9/1/2020  7:35 PM   Temp 36.3  C (97.3  F) 9/1/2020  7:35 PM   Pulse 64 9/1/2020  7:35 PM   Resp 16 9/1/2020  7:35 PM   SpO2 91 % 9/1/2020  7:35 PM     Post vital signs: stable  Level of consciousness: awake and responds to simple questions  Post-anesthesia pain: pain controlled  Post-anesthesia nausea and vomiting: no  Pulmonary: unassisted, return to baseline  Cardiovascular: stable and blood pressure at baseline  Hydration: adequate  Anesthetic events: no    QCDR Measures:  ASA# 11 - Irene-op Cardiac Arrest: ASA11B - Patient did NOT experience unanticipated cardiac arrest  ASA# 12 - Irene-op Mortality Rate: ASA12B - Patient did NOT die  ASA# 13 - PACU Re-Intubation Rate: NA - No ETT / LMA used for case  ASA# 10 - Composite Anes Safety: ASA10A - No serious adverse event    Additional Notes:

## 2021-06-11 NOTE — ANESTHESIA CARE TRANSFER NOTE
Last vitals:   Vitals:    09/01/20 1700   BP: 123/57   Pulse: (!) 52   Resp: 16   Temp: 36.3  C (97.3  F)   SpO2: 100%     Patient's level of consciousness is awake  Spontaneous respirations: yes  Maintains airway independently: yes  Dentition unchanged: yes  Oropharynx: oropharynx clear of all foreign objects    QCDR Measures:  ASA# 20 - Surgical Safety Checklist: WHO surgical safety checklist completed prior to induction    PQRS# 430 - Adult PONV Prevention: 4558F - Pt received => 2 anti-emetic agents (different classes) preop & intraop  ASA# 8 - Peds PONV Prevention: NA - Not pediatric patient, not GA or 2 or more risk factors NOT present  PQRS# 424 - Irene-op Temp Management: 4559F - At least one body temp DOCUMENTED => 35.5C or 95.9F within required timeframe  PQRS# 426 - PACU Transfer Protocol: - Transfer of care checklist used  ASA# 14 - Acute Post-op Pain: ASA14B - Patient did NOT experience pain >= 7 out of 10

## 2021-06-11 NOTE — ANESTHESIA PREPROCEDURE EVALUATION
Anesthesia Evaluation      Patient summary reviewed   No history of anesthetic complications     Airway   Mallampati: II  Neck ROM: full   Pulmonary - normal exam    breath sounds clear to auscultation  (+) COPD, sleep apnea on CPAP, ,                          Cardiovascular - normal exam  Exercise tolerance: > or = 4 METS  (+) hypertension, , hypercholesterolemia,     Rhythm: regular  Rate: normal,         Neuro/Psych      Comments: PTSD    Endo/Other - negative ROS      GI/Hepatic/Renal - negative ROS           Dental - normal exam                        Anesthesia Plan  Planned anesthetic: spinal    ASA 2   Induction: intravenous   Anesthetic plan and risks discussed with: patient  Anesthesia plan special considerations: antiemetics,   Post-op plan: routine recovery

## 2021-06-16 PROBLEM — Z96.649 FAILED TOTAL HIP ARTHROPLASTY WITH DISLOCATION, SEQUELA: Status: ACTIVE | Noted: 2020-09-01

## 2021-06-16 PROBLEM — G47.33 OSA (OBSTRUCTIVE SLEEP APNEA): Status: ACTIVE | Noted: 2020-07-17

## 2021-06-16 PROBLEM — E78.5 HLD (HYPERLIPIDEMIA): Status: ACTIVE | Noted: 2020-07-17

## 2021-06-16 PROBLEM — F43.10 PTSD (POST-TRAUMATIC STRESS DISORDER): Status: ACTIVE | Noted: 2020-07-17

## 2021-06-16 PROBLEM — T84.028S FAILED TOTAL HIP ARTHROPLASTY WITH DISLOCATION, SEQUELA: Status: ACTIVE | Noted: 2020-09-01

## 2021-06-16 PROBLEM — T84.028D FAILED TOTAL HIP ARTHROPLASTY WITH DISLOCATION, SUBSEQUENT ENCOUNTER: Status: ACTIVE | Noted: 2020-07-16

## 2021-06-16 PROBLEM — Z96.649 FAILED TOTAL HIP ARTHROPLASTY WITH DISLOCATION, SUBSEQUENT ENCOUNTER: Status: ACTIVE | Noted: 2020-07-16

## 2021-06-16 PROBLEM — I10 ESSENTIAL HYPERTENSION, BENIGN: Status: ACTIVE | Noted: 2020-07-17

## 2021-06-16 PROBLEM — D62 ANEMIA DUE TO BLOOD LOSS, ACUTE: Status: ACTIVE | Noted: 2020-07-17

## 2021-06-18 NOTE — PATIENT INSTRUCTIONS - HE
Patient Instructions by Sarah Pro RN at 8/10/2020  8:00 AM     Author: Sarah Pro RN Service: -- Author Type: Registered Nurse    Filed: 8/10/2020  8:54 AM Encounter Date: 8/10/2020 Status: Signed    : Sarah Pro RN (Registered Nurse)       Patient Education     Discharge Instructions: Eating a High-Potassium Diet  Your healthcare provider has told you to eat a high-potassium diet. This may be because you have low levels of potassium in your blood. Or it may be because you have high blood pressure. Potassium is found in many foods. These include dairy products, nuts, seeds, and beans. Its also found in many fruits and vegetables in high amounts.  Guidelines for a high-potassium diet    Eat fruits and vegetables in their fresh or raw form most often.    Check labels for ingredients that have potassium. This includes potassium chloride. Add these items to your diet.    Try salt substitutes. Many of these have potassium.    Avoid licorice. This includes licorice root and teas that have licorice. These can reduce potassium levels in your body.  Eat plenty of the following high-potassium foods:    Fruits. Good choices are apricots (canned and fresh), bananas, cantaloupe, honeydew melon, kiwi, nectarines, oranges, orange juice, and pears. Dried fruits include apricots, dates, figs, and prunes. Prune juice also has potassium.    Vegetables. Good choices are asparagus, avocado, artichoke, broccoli, bamboo shoots, beets, [place] sprouts, cabbage, celery, chard, okra, potatoes (white and sweet), pumpkin, rutabaga, spinach (cooked), squash, and tomatoes. Tomato sauce, tomato juice, and vegetable juice cocktail are also good choices.    Chicken, fish, clams, and crab    Milk, chocolate milk, buttermilk, and soy milk    Legumes. These include black-eyed peas, chickpeas, lentils, lima beans, navy beans, red kidney beans, soybeans, and split peas.    Nuts and seeds. Try almonds, [place]  nuts, cashews, peanuts, peanut butter, pecans, pumpkin seeds, sunflower seeds, and walnuts.    Breads and cereals. These include bran and whole-grain products.    Others foods include chocolate, cocoa, coconut milk, and molasses  Follow-up  Make a follow-up appointment for a repeat test.  When to call your healthcare provider  Call your healthcare provider right away or go to the ER if you have any of the following:    Vomiting    Extreme tiredness (fatigue)    Diarrhea    Rapid, irregular heartbeat    Shortness of breath    Chest pain    Muscle cramps, spasms, or twitching    Weakness    Paralysis   Date Last Reviewed: 11/1/2017 2000-2019 [employer]. [address] [place] [address], [place] [address]. All rights reserved. This information is not intended as a substitute for professional medical care. Always follow your healthcare professional's instructions.

## 2021-06-20 NOTE — LETTER
"Letter by Nolan Henderson MD at      Author: Nolan Henderson MD Service: -- Author Type: --    Filed:  Encounter Date: 8/26/2020 Status: (Other)         Oli Burns MD  [address]  [place] [address]                                  August 28, 2020    Patient: Alex York   MR Number: [id number]   YOB: 1945   Date of Visit: 8/26/2020     Dear Dr. Katy MD:    I have seen Alex York in follow up. Below are the relevant portions of my assessment and plan of care.    If you have questions, please do not hesitate to call me.     Sincerely,        Nolan Henderson MD          CC  No Recipients  Nolan Henderson MD  8/28/2020  1:19 PM  Sign when Signing Visit  Alex York is a 75 y.o. male who is being evaluated via a billable video visit.      The patient has been notified of following:     \"This video visit will be conducted via a call between you and your physician/provider. We have found that certain health care needs can be provided without the need for an in-person physical exam.  This service lets us provide the care you need with a video conversation.  If a prescription is necessary we can send it directly to your pharmacy.  If lab work is needed we can place an order for that and you can then stop by our lab to have the test done at a later time.    Video visits are billed at different rates depending on your insurance coverage. Please reach out to your insurance provider with any questions.    If during the course of the call the physician/provider feels a video visit is not appropriate, you will not be charged for this service.\"    Patient has given verbal consent to a Video visit? Yes  How would you like to obtain your AVS? AVS Preference: Mail a copy.  If dropped by the video visit, the video invitation should be sent to: Text to cell phone: [phone]  Will anyone else be joining your video visit? No        Video Start Time: 12:16 PM    Additional " provider notes:      7 or 8 years ago he had a right hip Delisa resurfacing procedure, and this was complicated by metallosis.     He had persistent pain and discomfort and occasional popping of the right hip.     On 7/16/20 he underwent removal of prosthesis and placement of a Prostalac spacer.     Nothing grew on cultures however the operative findings did not look consistent with solely metallosis, therefore we opted to treat with a course of vancomycin for suspected infection.    He is tolerating treatment, he is getting vancomycin through  infusion center.     Exam:  No distress  PICC in place  Breathing comfortably  Hip not examined.  No apparent rash       ASSESSMENT:  History of metallosis with Delisa resurfacing   Status post right hip Delisa resurfacing explant and placement of prostalac right hip on 7/16/2020.  Cultures no growth with broth having been held for 2 weeks.  Gram stain with no PMN and no organisms.  Normal CRP preop. Cannot say conclusively whether this was infection or not.   Now nearly finished with 6 weeks post-op IV vancomycin.   Discussed with Dr. Burns when he was in the hospital -- operative findings were out of the ordinary compared to what is typically seen with metallosis alone -- typically fluid is grey in color, and this was white, looked like pus. Therefore there was concern for infection despite other findings. We discussed setting him up for re-implant at the end of antibiotic course, and he is scheduled for this next week. We can continue with that plan as things appear to be going well.      Recommendations:  Vancomycin plan for 6 weeks to be completed 8/28 am then remove PICC at  infusion center.  I left message with Dr. Burns after the visit with plans to proceed. We can discuss if needed.   Return as needed.     Video-Visit Details    Type of service:  Video Visit    Video End Time (time video stopped): 12:28pm  Originating Location (pt. Location):  Home    [facility] ([email or web address]     Platform used for Video Visit: Letha Henderson MD

## 2021-07-03 NOTE — ADDENDUM NOTE
Addendum Note by Jalyn Miles MD at 7/16/2020  8:12 PM     Author: Jalyn Miles MD Service: -- Author Type: Anesthesiologist    Filed: 7/16/2020  8:12 PM Date of Service: 7/16/2020  8:12 PM Status: Signed    : Jalyn Miles MD (Anesthesiologist)       Addendum  created 07/16/20 2012 by Jalyn Miles MD    Attestation recorded in Intraprocedure, Intraprocedure Attestations filed, Intraprocedure Event edited            Spoke with pt.  She said she would come in to the walk in

## 2023-06-05 ENCOUNTER — TRANSFERRED RECORDS (OUTPATIENT)
Dept: HEALTH INFORMATION MANAGEMENT | Facility: CLINIC | Age: 78
End: 2023-06-05
Payer: COMMERCIAL

## 2023-06-08 ENCOUNTER — TRANSFERRED RECORDS (OUTPATIENT)
Dept: HEALTH INFORMATION MANAGEMENT | Facility: CLINIC | Age: 78
End: 2023-06-08
Payer: COMMERCIAL

## 2023-06-09 ENCOUNTER — TRANSFERRED RECORDS (OUTPATIENT)
Dept: HEALTH INFORMATION MANAGEMENT | Facility: CLINIC | Age: 78
End: 2023-06-09
Payer: COMMERCIAL

## 2023-06-13 ENCOUNTER — TRANSCRIBE ORDERS (OUTPATIENT)
Dept: OTHER | Age: 78
End: 2023-06-13

## 2023-06-13 DIAGNOSIS — I67.1 CEREBRAL ANEURYSM, NONRUPTURED: Primary | ICD-10-CM

## 2023-06-16 ENCOUNTER — TELEPHONE (OUTPATIENT)
Dept: NEUROSURGERY | Facility: CLINIC | Age: 78
End: 2023-06-16
Payer: COMMERCIAL

## 2023-06-16 NOTE — TELEPHONE ENCOUNTER
[facility] Call [facility]    Phone Message    May a detailed message be left on voicemail: yes     Reason for Call: Other: Pt is calling and wanting to make appt. to be seen in neurosurgery. Pt has a referral but writer does not see any notes to schedule appt. Please call Pt back to schedule appt.     Action Taken: Message routed to:  [facility] Neurosurgery    Travel Screening: Not Applicable                                                                       The history is provided by the patient. Past Medical History:   Diagnosis Date    Arrhythmia     Arthritis     Asthma     CAD (coronary artery disease)     Chronic pain     back, left wrist, left knee    COPD     Gastrointestinal disorder     diverticulitis    HTN (hypertension) 4/10/2013    Morbid obesity ([facility] [address]     Other ill-defined conditions(799.89)     chronic back pain, restless leg    Psychiatric disorder     ptsd    Stroke ([facility] [address]     x 2    Unspecified sleep apnea     on nightly CPAP       Past Surgical History:   Procedure Laterality Date    ABDOMEN SURGERY PROC UNLISTED      gastric bypass    HX CHOLECYSTECTOMY      HX COLECTOMY      Secondary to a perforated diverticulum    HX ORTHOPAEDIC      back, acl, mcl, wrist; He reports 18 total procedures/arthroscopies on his L knee    IA LEFT HEART CATH,PERCUTANEOUS  4/2/2012    Xience to posterior descending with 2 stents         Family History:   Problem Relation Age of Onset    Cancer Mother      Glioblastoma    Cancer Brother      Colon cancer    Heart Disease Other        Social History     Social History    Marital status:      Spouse name: N/A    Number of children: N/A    Years of education: N/A     Occupational History    Not on file. Social History Main Topics    Smoking status: Never Smoker    Smokeless tobacco: Not on file    Alcohol use No    Drug use: No    Sexual activity: Not on file     Other Topics Concern    Not on file     Social History Narrative    Patient lives alone. He uses rollator. ALLERGIES: Tizanidine; Fortaz [ceftazidime (anhydrous)]; and Pcn [penicillins]    Review of Systems   Constitutional: Negative for chills and fever. HENT: Negative. Respiratory: Negative. Cardiovascular: Negative. Gastrointestinal: Negative. Endocrine: Negative. Genitourinary: Negative. Musculoskeletal: Negative. Skin: Negative. Neurological: Positive for weakness.  Negative for syncope. Hematological: Negative. Vitals:    03/25/17 2048   BP: 122/83   Pulse: (!) 103   Resp: 24   Temp: 98.1 °F (36.7 °C)   SpO2: 98%   Weight: 121.6 kg (268 lb)   Height: 6' 6\" (1.981 m)            Physical Exam   Constitutional: He is oriented to person, place, and time. He appears well-developed and well-nourished. Non-toxic appearance. He has a sickly appearance. He does not appear ill. No distress. HENT:   Head: Normocephalic and atraumatic. Cardiovascular: Intact distal pulses. Pulmonary/Chest: Effort normal.   Abdominal: Soft. Musculoskeletal:   Relative edematous lower left extremity with some bruising behind the left knee. Patient has exquisite tenderness in the ankle region and left foot without deformity primarily on the lateral aspect. Temperature and appearance of lower extremities is symmetrical bilaterally. Dorsalis pedis pulses are palpable. Neurological: He is alert and oriented to person, place, and time. Skin: Skin is warm and dry. Psychiatric: He has a normal mood and affect. His behavior is normal.   Nursing note and vitals reviewed. MDM  Number of Diagnoses or Management Options  Diagnosis management comments: Patient states that he had fallen multiple times at rehabilitation while doing orthostatic vital signs and started to have pain after these falls which may suggest an orthopedic cause of these pains that he has been experiencing but since he had some swelling present we discussed complete ultrasound to evaluate for DVT. Ultrasound did reveal DVT bilaterally but is unclear the chronicity. I reviewed this with the on-call vascular surgeon, Dr. Tami Abraham, and agreed that patient should initially be placed on medication such as her also until follow-up.   At that point his doctors can decide whether or not this needs to be continued but without knowing the chronicity, and having new complaints, unclear if it is muscle skeletal only in nature as the source of his pain. Discussed with patient and use a splint for comfort around his ankle joint despite negative x-rays. Started on Zaroxolyn in the ER. And will follow-up in the time. His initial ulcer also loading to decide whether or not this should be continued longer by his primary care physicians. Amount and/or Complexity of Data Reviewed  Tests in the radiology section of CPT®: ordered and reviewed (Xr Ankle Lt Min 3 V    Result Date: 3/25/2017  EXAM:  XR ANKLE LT MIN 3 V INDICATION:  pain COMPARISON: None. FINDINGS: Three views of the left ankle demonstrate no fracture or disruption of the ankle mortise. No other osseous, articular or soft tissue abnormalities are identified. IMPRESSION: Normal left ankle. Xr Foot Lt Min 3 V    Result Date: 3/25/2017  EXAM:  XR FOOT LT MIN 3 V INDICATION:   pain COMPARISON:  None. FINDINGS:  3 views of the left foot demonstrate no fracture or other osseous, articular or soft tissue abnormality. IMPRESSION:  Normal left foot. Duplex Lower Ext Venous Bilat    Result Date: 3/26/2017  BILATERAL LOWER EXTREMITY VENOUS DUPLEX ULTRASOUND. HISTORY:  Pain. TECHNIQUE: Direct skin-contact high resolution grayscale images, color-flow Doppler and duplex waveform analysis. FINDINGS: There is bilateral occlusive deep vein thrombosis of the entire right leg and the entire left leg with sparing of the left profundofemoral artery.      IMPRESSION: Acute, diffuse bilateral lower extremity deep vein thrombosis.   )      ED Course       Procedures

## 2023-06-19 NOTE — TELEPHONE ENCOUNTER
RECORDS RECEIVED FROM: External   REASON FOR VISIT: Aneurysm   Date of Appt: 7/25/23 9:00am    NOTES (FOR ALL VISITS) STATUS DETAILS   OFFICE NOTE from referring provider Received 6/13/23 (Transcribed Orders) Néstor Jo MD @ [facility]     MEDICATION LIST Internal    IMAGING  (FOR ALL VISITS)     CT (HEAD, NECK, SPINE) Received [facility]   6/5/23  CTA Neck        Records Requested     June 21, 2023 10:30 AM   [id number]   Facility  [facility]   Outcome Per Pt, Sent request for all records & Imaging from [facility]. RIA

## 2023-06-20 NOTE — TELEPHONE ENCOUNTER
Pt states that we do not have all the records that we need for his upcoming visit.  [facility]  The fax # [phone].  Please call Pt with any questions.  Thanks.

## 2023-06-20 NOTE — TELEPHONE ENCOUNTER
Writer routed to Rosie Cerna MA. Patient call regarding imaging and reports for new patient appointment.     Yvette Hameed LPN  Neurosurgery

## 2023-07-25 ENCOUNTER — OFFICE VISIT (OUTPATIENT)
Dept: NEUROSURGERY | Facility: CLINIC | Age: 78
End: 2023-07-25
Attending: ANESTHESIOLOGY
Payer: MEDICARE

## 2023-07-25 ENCOUNTER — PRE VISIT (OUTPATIENT)
Dept: NEUROSURGERY | Facility: CLINIC | Age: 78
End: 2023-07-25

## 2023-07-25 VITALS
HEART RATE: 57 BPM | BODY MASS INDEX: 29.38 KG/M2 | HEIGHT: 70 IN | OXYGEN SATURATION: 92 % | DIASTOLIC BLOOD PRESSURE: 73 MMHG | SYSTOLIC BLOOD PRESSURE: 134 MMHG | WEIGHT: 205.2 LBS

## 2023-07-25 DIAGNOSIS — I67.1 CEREBRAL ANEURYSM, NONRUPTURED: ICD-10-CM

## 2023-07-25 PROCEDURE — 99203 OFFICE O/P NEW LOW 30 MIN: CPT | Performed by: NURSE PRACTITIONER

## 2023-07-25 ASSESSMENT — PAIN SCALES - GENERAL: PAINLEVEL: MILD PAIN (3)
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
## 2023-07-25 NOTE — PATIENT INSTRUCTIONS
Scheduling will contact you to make arrangements for your angiogram. You will need a  home and someone that can stay with you through the night. Do not eat for 8 hours prior to arrival time. You may drink clear liquids (includes water, Jell-O, clear broth, apple juice or any non-carbonated beverage that you can see through) until 2 hours prior to arrival time. You may take your medications with a sip of water. You will check in at the Gold waiting room at the [facility] 1.5 hours prior to your procedure. You will receive written instructions and a map to the hospital after your procedure has been scheduled.      If you have questions regarding your procedure, please contact us at [phone], option 1.    If you need to cancel, reschedule or have procedure scheduling related questions, please call Jocelyn at [phone].    Thank you,  Trinh Travis RN & Nahomy Posada RN, CNRN, SCRN  Stroke & Endovascular Care Coordinators
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
## 2023-07-26 ENCOUNTER — TELEPHONE (OUTPATIENT)
Dept: NEUROSURGERY | Facility: CLINIC | Age: 78
End: 2023-07-26
Payer: MEDICARE

## 2023-07-26 NOTE — TELEPHONE ENCOUNTER
Patient is scheduled for surgery with Dr. Cannon    Spoke with: patient    Date of Surgery: 09/25/23    Location: [facility]    Pre op with Provider: IV Conscious    Additional comments: Patient is aware of date and time of the procedure.        Ariella Bethea MA on 7/26/2023 at 10:39 AM

## 2023-08-14 ENCOUNTER — PATIENT OUTREACH (OUTPATIENT)
Dept: NEUROLOGY | Facility: CLINIC | Age: 78
End: 2023-08-14
Payer: MEDICARE

## 2023-08-14 NOTE — PROGRESS NOTES
Gave patient instructions in clinic at office visit on 7/25 with Dr. Cannon. Patient verbalized understanding. Printed instructions and mailed to patient. Instructions include contact number so patient can call with questions/concerns.     Trinh Travis RN 8/14/2023 12:50 PM

## 2023-08-14 NOTE — PATIENT INSTRUCTIONS
You are scheduled for a Diagnostic Cerebral Angiogram with Dr. Cannon on 9/25/23. Arrival Time: 6:30am. Procedure Time: 8:00am.    Please follow these instructions:    * Check in at the [facility] at the [facility]. The address is [address] [place] [address]. The phone number is [phone].     * Nothing to eat for 8 hours prior to arrival time. You may drink clear liquids (includes water, Jell-O, clear broth, apple juice or any non-carbonated beverage that you can see through) up until 2 hours prior to arrival time.     * You may take your medications with a sip of water the morning of the procedure.     * You will be discharged the same day. You must have a  home and someone that can stay with you through the night.     PLEASE NOTE our COVID-19 visitors policy: For the protection of our patients and visitors, patients are allowed two consistent visitors, 5 years of age or older, per adult patient in the hospital.     All discharge instructions will be given to the  or volunteer. Documentation for the post-operative plan will be given to the patient and . Patients are required to have someone to stay with them for 24 hours after their procedure.    If you have questions regarding your procedure, please contact me at [phone], option 1.    If you need to cancel, reschedule or have procedure scheduling related questions, please call Jocelyn at [phone].    Thank you,  Nahomy Posada RN, CNRN, SCRN  Trinh Travis RN  Stroke & Neuroendovascular Care Coordinator     Cerebral Angiogram - What to Expect      You are scheduled for an angiogram, which is a procedure that uses x-rays to view the arteries (blood vessels that carry blood from the heart out to the body).     After you arrive, the nursing staff will take a short history and assessment before the procedure begins. Your physician will explain the procedure to you and your  family, including the possible risks and side effects.  Be sure to ask questions and address any concerns you may have. You will then be asked to sign a consent form for the procedure.     During the procedure a small tube or catheter will be placed into one of your arteries (either the femoral artery in the groin, or the radial artery in the arm)  and maneuvered to the specific artery being studied.  Contrast medium (x-ray dye) will be injected into the blood vessel and x-rays will be taken of the area.    Once the procedure is completed the catheter will be removed and pressure held at the puncture site for 20 to 30 minutes to make sure the puncture site seals. An internal closure device may be used to secure the arterial puncture as well.     During the exam, you are routinely monitored by a heart monitor and an oxygen saturation monitor that lets us know how well you are breathing.  A blood pressure cuff will be on your arm.  An IV is started to provide you with medications and IV fluids during the procedure.    After the exam you will need to be on complete bed rest for 2 to 6 hours.  The nurse will monitor your vital signs, puncture site, pulses and temperature of the area that was punctured.     After you are discharged do not drive until the next morning and an adult must be with you until then. You may resume your normal activities the day after the procedure.  Do not do any strenuous exercise or lifting for 48 hours after the procedure.     Preparation:   Laboratory tests will be obtained by your doctor the day of the exam (Basic Metabolic Panel, CBCP, PTT and INR).  Someone will need to drive you to and from the hospital.  Be sure to have someone who can stay with you through the night.   If you are allergic to x-ray contrast or iodine, contact your doctor or the nurse coordinator prior to the exam day for instructions.  If you are or may be pregnant contact your doctor or nurse coordinator prior to the  day of the exam.  If you have diabetes, check with your doctor or the RN Care Coordinator to see if your insulin should be adjusted for the exam.  If you are taking a medication called Glucophage, Glucovance, or Metformin; these medications need to be held the day of the exam and for approximately 48 hours following the procedure.   If you are taking Coumadin (to thin your blood) please contact the RN Care Coordinator at least 7 days before the exam for special instructions.  You should not have received barium (x-ray contrast) within 48 hours of this procedure.   Do not smoke for 24 hours prior to the procedure.  Do not eat for 8 hours prior to arrival time. You may drink clear liquids (water, ginger ale, etc) until 2 hours prior to arrival time.  You may brush your teeth and take your medications as directed with a sip of water.    Feel free to contact Nahomy Posada or Trinh Travis, our RN Care Coordinators, at [phone] with any questions or concerns. After business hours, call the  at [phone] and have the Neuro-Interventional Fellow paged.

## 2023-09-20 ENCOUNTER — TELEPHONE (OUTPATIENT)
Dept: NEUROSURGERY | Facility: CLINIC | Age: 78
End: 2023-09-20
Payer: MEDICARE

## 2023-09-21 NOTE — TELEPHONE ENCOUNTER
Called patient LVM stating patient can call [phone] to pre registered for surgery. Left our clinic number to call us back if patient has any other questions or concerns.   
[facility] Call Center    Phone Message    May a detailed message be left on voicemail: yes     Reason for Call: Other: Patient is calling requesting to pre register for surgery. He states that he had tried the number that was given to him but it got him to a . Please call patient back to assist him with pre registering.      Action Taken: Message routed to:  [facility] ([facility]): [facility] [place] [facility]    Travel Screening: Not Applicable                                                                   
FT

## 2023-09-25 ENCOUNTER — APPOINTMENT (OUTPATIENT)
Dept: MEDSURG UNIT | Facility: CLINIC | Age: 78
End: 2023-09-25
Attending: NEUROLOGICAL SURGERY
Payer: MEDICARE

## 2023-09-25 ENCOUNTER — HOSPITAL ENCOUNTER (OUTPATIENT)
Facility: CLINIC | Age: 78
Discharge: HOME | End: 2023-09-25
Attending: NEUROLOGICAL SURGERY | Admitting: NEUROLOGICAL SURGERY
Payer: MEDICARE

## 2023-09-25 ENCOUNTER — APPOINTMENT (OUTPATIENT)
Dept: INTERVENTIONAL RADIOLOGY/VASCULAR | Facility: CLINIC | Age: 78
End: 2023-09-25
Attending: NEUROLOGICAL SURGERY
Payer: MEDICARE

## 2023-09-25 VITALS
SYSTOLIC BLOOD PRESSURE: 157 MMHG | RESPIRATION RATE: 18 BRPM | HEIGHT: 69 IN | OXYGEN SATURATION: 94 % | HEART RATE: 68 BPM | TEMPERATURE: 97.9 F | DIASTOLIC BLOOD PRESSURE: 84 MMHG | BODY MASS INDEX: 30.04 KG/M2 | WEIGHT: 202.8 LBS

## 2023-09-25 DIAGNOSIS — I67.1 CEREBRAL ANEURYSM, NONRUPTURED: ICD-10-CM

## 2023-09-25 LAB
ANION GAP SERPL CALCULATED.3IONS-SCNC: 12 MMOL/L (ref 7–15)
APTT PPP: 25 SECONDS (ref 22–38)
BUN SERPL-MCNC: 24.8 MG/DL (ref 8–23)
CALCIUM SERPL-MCNC: 9.2 MG/DL (ref 8.8–10.2)
CHLORIDE SERPL-SCNC: 101 MMOL/L (ref 98–107)
CREAT SERPL-MCNC: 1.03 MG/DL (ref 0.67–1.17)
DEPRECATED HCO3 PLAS-SCNC: 28 MMOL/L (ref 22–29)
EGFRCR SERPLBLD CKD-EPI 2021: 75 ML/MIN/1.73M2
ERYTHROCYTE [DISTWIDTH] IN BLOOD BY AUTOMATED COUNT: 12.1 % (ref 10–15)
GLUCOSE SERPL-MCNC: 111 MG/DL (ref 70–99)
HCT VFR BLD AUTO: 42.8 % (ref 40–53)
HGB BLD-MCNC: 14.6 G/DL (ref 13.3–17.7)
INR PPP: 1.02 (ref 0.85–1.15)
MCH RBC QN AUTO: 30.3 PG (ref 26.5–33)
MCHC RBC AUTO-ENTMCNC: 34.1 G/DL (ref 31.5–36.5)
MCV RBC AUTO: 89 FL (ref 78–100)
PLATELET # BLD AUTO: 264 10E3/UL (ref 150–450)
POTASSIUM SERPL-SCNC: 3.2 MMOL/L (ref 3.4–5.3)
RBC # BLD AUTO: 4.82 10E6/UL (ref 4.4–5.9)
SODIUM SERPL-SCNC: 141 MMOL/L (ref 136–145)
WBC # BLD AUTO: 11.8 10E3/UL (ref 4–11)

## 2023-09-25 PROCEDURE — 999N000142 HC STATISTIC PROCEDURE PREP ONLY

## 2023-09-25 PROCEDURE — 36415 COLL VENOUS BLD VENIPUNCTURE: CPT | Performed by: STUDENT IN AN ORGANIZED HEALTH CARE EDUCATION/TRAINING PROGRAM

## 2023-09-25 PROCEDURE — 76937 US GUIDE VASCULAR ACCESS: CPT | Mod: 26 | Performed by: NEUROLOGICAL SURGERY

## 2023-09-25 PROCEDURE — 255N000002 HC RX 255 OP 636: Performed by: NEUROLOGICAL SURGERY

## 2023-09-25 PROCEDURE — 36224 PLACE CATH CAROTD ART: CPT

## 2023-09-25 PROCEDURE — 85610 PROTHROMBIN TIME: CPT | Performed by: STUDENT IN AN ORGANIZED HEALTH CARE EDUCATION/TRAINING PROGRAM

## 2023-09-25 PROCEDURE — 250N000011 HC RX IP 250 OP 636: Performed by: STUDENT IN AN ORGANIZED HEALTH CARE EDUCATION/TRAINING PROGRAM

## 2023-09-25 PROCEDURE — 85730 THROMBOPLASTIN TIME PARTIAL: CPT | Performed by: STUDENT IN AN ORGANIZED HEALTH CARE EDUCATION/TRAINING PROGRAM

## 2023-09-25 PROCEDURE — 272N000506 HC NEEDLE CR6

## 2023-09-25 PROCEDURE — 99153 MOD SED SAME PHYS/QHP EA: CPT

## 2023-09-25 PROCEDURE — 99152 MOD SED SAME PHYS/QHP 5/>YRS: CPT

## 2023-09-25 PROCEDURE — 36224 PLACE CATH CAROTD ART: CPT | Mod: LT | Performed by: NEUROLOGICAL SURGERY

## 2023-09-25 PROCEDURE — 36226 PLACE CATH VERTEBRAL ART: CPT

## 2023-09-25 PROCEDURE — 272N000192 HC ACCESSORY CR2

## 2023-09-25 PROCEDURE — 36226 PLACE CATH VERTEBRAL ART: CPT | Mod: RT | Performed by: NEUROLOGICAL SURGERY

## 2023-09-25 PROCEDURE — 258N000003 HC RX IP 258 OP 636: Performed by: STUDENT IN AN ORGANIZED HEALTH CARE EDUCATION/TRAINING PROGRAM

## 2023-09-25 PROCEDURE — 999N000133 HC STATISTIC PP CARE STAGE 2

## 2023-09-25 PROCEDURE — 82310 ASSAY OF CALCIUM: CPT | Performed by: STUDENT IN AN ORGANIZED HEALTH CARE EDUCATION/TRAINING PROGRAM

## 2023-09-25 PROCEDURE — 272N000566 HC SHEATH CR3

## 2023-09-25 PROCEDURE — C1887 CATHETER, GUIDING: HCPCS

## 2023-09-25 PROCEDURE — 250N000009 HC RX 250: Performed by: STUDENT IN AN ORGANIZED HEALTH CARE EDUCATION/TRAINING PROGRAM

## 2023-09-25 PROCEDURE — C1760 CLOSURE DEV, VASC: HCPCS

## 2023-09-25 PROCEDURE — 85027 COMPLETE CBC AUTOMATED: CPT | Performed by: STUDENT IN AN ORGANIZED HEALTH CARE EDUCATION/TRAINING PROGRAM

## 2023-09-25 PROCEDURE — 76377 3D RENDER W/INTRP POSTPROCES: CPT | Mod: 26 | Performed by: NEUROLOGICAL SURGERY

## 2023-09-25 PROCEDURE — 99152 MOD SED SAME PHYS/QHP 5/>YRS: CPT | Performed by: NEUROLOGICAL SURGERY

## 2023-09-25 PROCEDURE — C1769 GUIDE WIRE: HCPCS

## 2023-09-25 PROCEDURE — 76377 3D RENDER W/INTRP POSTPROCES: CPT | Mod: XS

## 2023-09-25 RX ORDER — FLUMAZENIL 0.1 MG/ML
0.2 INJECTION, SOLUTION INTRAVENOUS
Status: DISCONTINUED | OUTPATIENT
Start: 2023-09-25 | End: 2023-09-25 | Stop reason: HOSPADM

## 2023-09-25 RX ORDER — NALOXONE HYDROCHLORIDE 0.4 MG/ML
0.4 INJECTION, SOLUTION INTRAMUSCULAR; INTRAVENOUS; SUBCUTANEOUS
Status: DISCONTINUED | OUTPATIENT
Start: 2023-09-25 | End: 2023-09-25 | Stop reason: HOSPADM

## 2023-09-25 RX ORDER — NALOXONE HYDROCHLORIDE 0.4 MG/ML
0.2 INJECTION, SOLUTION INTRAMUSCULAR; INTRAVENOUS; SUBCUTANEOUS
Status: DISCONTINUED | OUTPATIENT
Start: 2023-09-25 | End: 2023-09-25 | Stop reason: HOSPADM

## 2023-09-25 RX ORDER — ALBUTEROL SULFATE 0.83 MG/ML
2.5 SOLUTION RESPIRATORY (INHALATION) EVERY 6 HOURS PRN
COMMUNITY

## 2023-09-25 RX ORDER — METOPROLOL TARTRATE 25 MG/1
25 TABLET, FILM COATED ORAL 2 TIMES DAILY
COMMUNITY

## 2023-09-25 RX ORDER — FENTANYL CITRATE 50 UG/ML
25-50 INJECTION, SOLUTION INTRAMUSCULAR; INTRAVENOUS EVERY 5 MIN PRN
Status: DISCONTINUED | OUTPATIENT
Start: 2023-09-25 | End: 2023-09-25 | Stop reason: HOSPADM

## 2023-09-25 RX ORDER — SODIUM CHLORIDE 9 MG/ML
INJECTION, SOLUTION INTRAVENOUS CONTINUOUS
Status: DISCONTINUED | OUTPATIENT
Start: 2023-09-25 | End: 2023-09-25 | Stop reason: HOSPADM

## 2023-09-25 RX ORDER — IODIXANOL 320 MG/ML
100 INJECTION, SOLUTION INTRAVASCULAR ONCE
Status: COMPLETED | OUTPATIENT
Start: 2023-09-25 | End: 2023-09-25

## 2023-09-25 RX ORDER — KETOROLAC TROMETHAMINE 30 MG/ML
15 INJECTION, SOLUTION INTRAMUSCULAR; INTRAVENOUS ONCE
Status: COMPLETED | OUTPATIENT
Start: 2023-09-25 | End: 2023-09-25

## 2023-09-25 RX ORDER — ACETAMINOPHEN 500 MG
500-1000 TABLET ORAL EVERY 6 HOURS PRN
COMMUNITY

## 2023-09-25 RX ORDER — HEPARIN SODIUM 200 [USP'U]/100ML
1 INJECTION, SOLUTION INTRAVENOUS CONTINUOUS PRN
Status: DISCONTINUED | OUTPATIENT
Start: 2023-09-25 | End: 2023-09-25 | Stop reason: HOSPADM

## 2023-09-25 RX ORDER — HYDROXYZINE PAMOATE 25 MG/1
50 CAPSULE ORAL 3 TIMES DAILY PRN
COMMUNITY

## 2023-09-25 RX ORDER — LISINOPRIL 40 MG/1
40 TABLET ORAL DAILY
COMMUNITY

## 2023-09-25 RX ORDER — VENLAFAXINE HYDROCHLORIDE 150 MG/1
150 CAPSULE, EXTENDED RELEASE ORAL DAILY
COMMUNITY

## 2023-09-25 RX ORDER — LIDOCAINE 40 MG/G
CREAM TOPICAL
Status: DISCONTINUED | OUTPATIENT
Start: 2023-09-25 | End: 2023-09-25 | Stop reason: HOSPADM

## 2023-09-25 RX ADMIN — FENTANYL CITRATE 50 MCG: 50 INJECTION, SOLUTION INTRAMUSCULAR; INTRAVENOUS at 08:42

## 2023-09-25 RX ADMIN — SODIUM CHLORIDE: 9 INJECTION, SOLUTION INTRAVENOUS at 07:56

## 2023-09-25 RX ADMIN — FENTANYL CITRATE 25 MCG: 50 INJECTION, SOLUTION INTRAMUSCULAR; INTRAVENOUS at 08:30

## 2023-09-25 RX ADMIN — IODIXANOL 75 ML: 320 INJECTION, SOLUTION INTRAVASCULAR at 09:36

## 2023-09-25 RX ADMIN — HEPARIN SODIUM 1 BAG: 200 INJECTION, SOLUTION INTRAVENOUS at 09:13

## 2023-09-25 RX ADMIN — MIDAZOLAM 1 MG: 1 INJECTION INTRAMUSCULAR; INTRAVENOUS at 08:42

## 2023-09-25 RX ADMIN — LIDOCAINE HYDROCHLORIDE 10 ML: 10 INJECTION, SOLUTION EPIDURAL; INFILTRATION; INTRACAUDAL; PERINEURAL at 09:02

## 2023-09-25 RX ADMIN — KETOROLAC TROMETHAMINE 15 MG: 30 INJECTION, SOLUTION INTRAMUSCULAR; INTRAVENOUS at 10:32

## 2023-09-25 RX ADMIN — FENTANYL CITRATE 25 MCG: 50 INJECTION, SOLUTION INTRAMUSCULAR; INTRAVENOUS at 09:17

## 2023-09-25 RX ADMIN — MIDAZOLAM 0.5 MG: 1 INJECTION INTRAMUSCULAR; INTRAVENOUS at 08:30

## 2023-09-25 ASSESSMENT — ACTIVITIES OF DAILY LIVING (ADL)
ADLS_ACUITY_SCORE: 35

## 2023-09-25 ASSESSMENT — VISUAL ACUITY
OU: GLASSES

## 2023-09-25 NOTE — PROGRESS NOTES
Pt prepped for cerebral angiogram.PIV placed and labs sent and NS started.IV contrast discharge instructions were reviewed and the sheet given to pt.Charlie groins shaved and prepped and pulses marked and charted normal.Consent signed and questions answered with daughter at bedside.Neuro intact.

## 2023-09-25 NOTE — PROGRESS NOTES
Pt returned from IR via liter accompanied by nurse.His right groin site is C/D/I no hematoma and denies pain and pulses normal.Pt tolerating food and fluids.Neuro intact.

## 2023-09-25 NOTE — PROCEDURES
[facility]     Endovascular Surgical Neuroradiology Post-Procedure Note    Pre-Procedure Diagnosis:  Left posterior communicating artery aneurysm   Post-Procedure Diagnosis:  left posterior communicating artery aneurysm     Procedure(s):   Diagnostic cervicocerebral angiography    Findings:  Left fetal posterior cerebral artery with aneurysm .     Plan:  Will discuss in Wed case conference  Bedrest 2 hours    Primary Surgeon:  Dr. Abilio Cannon  Secondary Surgeon:  Not applicable  Secondary Surgeon Review:  None  Fellow:  Qi Ceballos MD, Lluvia Duval MD  Additional Assistants:  None    Prior to the start of the procedure and with procedural staff participation, I verbally confirmed: the patient s identity using two indicators, relevant allergies, that the procedure was appropriate and matched the consent or emergent situation, and that the correct equipment/implants were available. Immediately prior to starting the procedure I conducted the Time Out with the procedural staff and re-confirmed the patient s name, procedure, and site/side. (The Joint Commission universal protocol was followed.)  Yes    PRU value: Not applicable    Anesthesia:  Conscious Sedation  Medications:  100mcg IV Fentanyl, 10ml 1% subcutaneous Lidocaine,  1.5mg IV Midazolam  Puncture site:  Right Femoral Artery    Fluoroscopy time (minutes):  20.7  Radiation dose (mGy):   576.9     Contrast amount (mL):  50  Estimated blood loss (mL):  20    Closure:  Device    Disposition:  Home after recovery.        Sedation Post-Procedure Summary    Sedatives: Fentanyl and Midazolam (Versed)    Vital signs and pulse oximetry were monitored and remained stable throughout the procedure, and sedation was maintained until the procedure was complete.  The patient was monitored by staff until sedation discharge criteria were met.    Patient tolerance:  Patient tolerated the procedure well with no immediate  complications.    Time of sedation in minutes:  64 minutes from beginning to end of physician one to one monitoring.  Qi Ceballos MD  Endovascular Surgical Neuroradiology Fellow  [facility]  [phone]    Endovascular Surgical Neuroradiology staff is Dr. Cannon

## 2023-09-25 NOTE — PROGRESS NOTES
Patient tolerated recovery stage well. VSS, right groin site clean/dry/intact, no hematoma, and denies pain. Patient tolerated PO food and fluids. Teaching was done and discharge instructions were given. Patient ambulated, voided, and PIV was removed. Patient discharged from the hospital via wheel chair to home with daughter.

## 2023-09-25 NOTE — IR NOTE
Patient Name: Alex York  Medical Record Number: [id number]  Today's Date: 9/25/2023    Procedure: Cerebral angiogram  Proceduralist: Dr. Cannon, Dr. Duval, Dr. Ceballos  Pathology present: na    Procedure Start: 0838  Procedure end: 0934  Sedation medications administered:   Fentanyl 100 mcg  Versed 1.5 mg  Sedation time: 64 minutes (0830 - 0934)     Report given to:  RN  : jez    Other Notes: Pt arrived to IR room 3 from . Consent reviewed. Pt denies any questions or concerns regarding procedure. Pt positioned supine and monitored per protocol.     Pt tolerated procedure without any noted complications. Patient required oxygen and frequent reminders to stay still. EKG with ST depression and wide QRS. Dr. Duval made aware. Last EKG in 2020 showed similar findings.     Right Groin Access:  Angioseal deployed at 0933.  Bedrest x 2 hours.  Ambulation time: 1133 hours     Pt transferred back to .

## 2023-09-25 NOTE — DISCHARGE INSTRUCTIONS
[facility]         Interventional Radiology  Discharge Instructions Post Angiogram (NEURO)    AFTER YOU GO HOME  If you were given sedation, for the first 24 hours: we recommend that an adult stay with you, DO NOT drive or operate machinery at home or at work, DO NOT smoke, DO NOT make any important or legal decisions.  DO NOT drink alcoholic beverages the day of your procedure  DO relax and take it easy for 24 hours  DO drink plenty of fluids  DO resume your regular diet, unless otherwise instructed by your Primary Physician  DO NOT take a shower for at least 12 hours following your procedure. No tub bath, hot tubs, or swimming for 5 days. Do NOT scrub the procedure site vigorously for 5 days.      Care of groin site  It is normal to have a small bruise or lump at the site.  For the first 2 days, when you cough, sneeze or move your bowels, hold your hand over the puncture site and press gently.  Do NOT lift more than 10 pounds or do any strenuous exercise for at least 3 to 5 days.  Do not use lotion or powder near the puncture site for 3 days.  If you start bleeding from the site in your groin: lie down flat and press firmly on the site. Call your doctor as soon as you can.   Call 911 right away if you have: Heavy bleeding, bleeding that does not stop.    CALL THE PHYSICIAN IF:  You start bleeding from the procedure site.  You have numbness, coolness or change in color of the arm or leg of the puncture site  You experience changes in your vision, hearing, balance, coordination, speech, thinking or memory  You experience weakness in one or more extremity  You experience pain or redness at the puncture site  You develop nausea or vomiting  You develop hives or a rash or unexplained itching  You develop a temperature of 101 degrees F or greater      Stroke - Call 911    Remember: BE FAST       BALANCE--Sudden loss of balance or coordination. Example: trouble walking     EYES--Sudden problem seeing  out of one or both eyes     FACE--Sudden numbness or change to one side of the face. Examples: facial droop, uneven smile     ARM--Sudden numbness or weakness in one arm or leg     SPEECH--Sudden changes in speech or talking that doesn t make sense     TIME--if the person is having any of the symptoms above, CALL 911 immediately.      Symptoms may go away, then come back.     Sudden, worst headache of your life      ADDITIONAL INSTRUCTIONS  Instruction booklet given: Angioseal booklet    [facility] INTERVENTIONAL RADIOLOGY DEPARTMENT  Procedure Physician:  Dr Ceballos/Dr Duval  Date of procedure: September 25, 2023  Telephone Numbers: [phone]      Monday-Friday 7:30 am to 4:00 pm  [phone] After 4:00 pm Monday-Friday, Weekends & Holidays.   Ask for the Neuro-Interventional doctor on call.  Someone is on call 24 hrs/day  [facility] toll free number: [phone] Monday-Friday 8:00 am to 4:30 pm  [facility] Emergency Dept: [phone]

## 2023-09-25 NOTE — PROGRESS NOTES
[facility]     Endovascular Surgical Neuroradiology Pre-Procedure Note      HPI:  Alex York is a 78 year old male with an incidentally found left Pcomm aneurysm, he has no family history of aneurysms but does have hypertension that is well controlled. He is a former smoker.     Medical History:  Past Medical History:   Diagnosis Date    Arthritis     COPD (chronic obstructive pulmonary disease) (H)     Depression     High cholesterol     High cholesterol     Hypertension     Hypertension     PTSD (post-traumatic stress disorder)     PTSD (post-traumatic stress disorder)     combat medic in [place]    PTSD (post-traumatic stress disorder)     Sleep apnea        Surgical History:  Past Surgical History:   Procedure Laterality Date    APPENDECTOMY      ARTHROPLASTY REVISION HIP Right 7/16/2020    Procedure: RIGHT HIP VEGA RESURFACING EXPLANT AND PLACEMENT PROSTALAC RIGHT HIP;  Surgeon: Oli Burns MD;  Location: [facility] OR;  Service: Orthopedics    ARTHROPLASTY REVISION HIP Right 9/1/2020    Procedure: RIGHT TOTAL HIP ARTHROPLASTY RE-IMPLANTATION;  Surgeon: Oli Burns MD;  Location: [facility] OR;  Service: Orthopedics    BACK SURGERY      HERNIA REPAIR  2017    JOINT REPLACEMENT Right     hip    LAMINECTOMY AND MICRODISCECTOMY LUMBAR SPINE      L5-S1    ORTHOPEDIC SURGERY      hip surgery    ORTHOPEDIC SURGERY      back surgery x2    RESURFING HIP Right 2013 [place]       Family History:  No family history on file.    Social History:  Social History     Socioeconomic History    Marital status:      Spouse name: Not on file    Number of children: Not on file    Years of education: Not on file    Highest education level: Not on file   Occupational History    Not on file   Tobacco Use    Smoking status: Never    Smokeless tobacco: Never   Substance and Sexual Activity    Alcohol use: Yes     Alcohol/week: 3.0 standard drinks  of alcohol     Comment: rarely    Drug use: Never    Sexual activity: Not on file   Other Topics Concern    Parent/sibling w/ CABG, MI or angioplasty before 65F 55M? Not Asked   Social History Narrative    Not on file     Social Determinants of Health     Financial Resource Strain: Not on file   Food Insecurity: Not on file   Transportation Needs: Not on file   Physical Activity: Not on file   Stress: Not on file   Social Connections: Not on file   Interpersonal Safety: Not on file   Housing Stability: Not on file       Allergies:  Allergies   Allergen Reactions    Percocet [Oxycodone-Acetaminophen]        Is there a contrast allergy?  No    Medications:  Medications Prior to Admission   Medication Sig Dispense Refill Last Dose    ASPIRIN PO Take 81 mg by mouth (Patient not taking: Reported on 7/25/2023)       ATENOLOL        hydrochlorothiazide (MICROZIDE) 12.5 MG capsule Take 12.5 mg by mouth daily       HYDROcodone-acetaminophen (NORCO) 5-325 MG per tablet Take 1-2 tablets by mouth every 4 hours as needed for moderate to severe pain 20 tablet 0     SIMVASTATIN PO        TRAZODONE HCL PO       .    ROS:  The 5 point Review of Systems is negative other than noted in the HPI or here.     PHYSICAL EXAMINATION  Vital Signs:  B/P: Data Unavailable,  T: Data Unavailable,  P: Data Unavailable,  R: Data Unavailable    Cardio:  RRR  Pulmonary:  no respiratory distress  Abdomen:  non-distended    Neurologic  Mental Status:  fully alert, attentive and oriented, follows commands, speech clear and fluent  Cranial Nerves:  visual fields intact, EOMI with normal smooth pursuit, facial movements symmetric, hearing not formally tested but intact to conversation, palate elevation symmetric and uvula midline, no dysarthria, shoulder shrug strong bilaterally, tongue protrusion midline  Motor:  no abnormal movements, normal tone throughout, normal muscle bulk, strength 5/5 throughout upper and lower extremities  Sensory:   intact/symmetric to light touch and pin prick throughout upper and lower extremities  Coordination:  FNF and HS intact without dysmetria    Pre-procedure National Institutes of Health Stroke Scale:   Not applicable    LABS  (most recent Cr, BUN, GFR, PLT, INR, PTT within the past 7 days):  No lab results found in last 7 days.     Platelet Function P2Y12 (PRU):  Not applicable      ASSESSMENT: 78 M with an incidentally found left Pcomm aneurysm .     PLAN:   Diagnostic cerebral angiogram     PRE-PROCEDURE SEDATION ASSESSMENT     Pre-Procedure Sedation Assessment done at 0740.    Expected Level:  Moderate Sedation    Indication:  Sedation is required to allow for neurointerventional procedure.    Consent obtained from patient after discussing the risks, benefits and alternatives.     PO Intake:  Appropriately NPO for procedure    ASA Class:  Class 2 - MILD SYSTEMIC DISEASE, NO ACUTE PROBLEMS, NO FUNCTIONAL LIMITATIONS.    Mallampati:  Grade 2:  Soft palate, base of uvula, tonsillar pillars, and portion of posterior pharyngeal wall visible    History and physical reviewed and no updates needed. I have reviewed the lab findings, diagnostic data, medications, and the plan for sedation. I have determined this patient to be an appropriate candidate for the planned sedation and procedure and have reassessed the patient IMMEDIATELY PRIOR to sedation and procedure.    Patient was discussed with the Attending, Dr. Cannon, who agrees with the plan.  Qi Ceballos MD  Endovascular Surgical Neuroradiology Fellow  [facility]  [phone]    Endovascular Surgical Neuroradiology staff is Dr. Cannon

## 2023-09-27 ENCOUNTER — PATIENT OUTREACH (OUTPATIENT)
Dept: NEUROLOGY | Facility: CLINIC | Age: 78
End: 2023-09-27
Payer: MEDICARE

## 2023-09-27 NOTE — PROGRESS NOTES
Endovascular Surgical Neuroradiology - Post Discharge Call    Admit: 9/25/23    Discharge: 9/25/23    Facility: [facility]    MD/Service: Dr. Cannon/LUDIVINA    Procedure Diagnosis:  left posterior communicating artery aneurysm      Procedure(s):   Diagnostic cervicocerebral angiography (right femoral artery puncture)     Findings:  Left fetal posterior cerebral artery with aneurysm .      Plan:    Discuss in Wed case conference  Follow-up with Dr. Cannon next week Monday or Tuesday    Spoke with patient. States he has a headache. Up every 2 hours with headache. Holocephalic, however more frontal. 4/10 pain. Has not used any pain relievers. Drinking adequate fluids. Patient will try Tylenol and call if no relief.     Denies bleeding, drainage, pain, swelling, warmth, redness at puncture site. Eating and drinking ok.     Held in person appointment on 10/3/23 at 1100 (message sent to schedule)     Patient aware of appointment. Appreciative of the call.     Patient has my contact information and was encouraged to call with questions/concerns.     Nahomy Posada RN 9/27/2023 10:29 AM

## 2023-10-03 ENCOUNTER — OFFICE VISIT (OUTPATIENT)
Dept: NEUROSURGERY | Facility: CLINIC | Age: 78
End: 2023-10-03
Payer: MEDICARE

## 2023-10-03 VITALS
SYSTOLIC BLOOD PRESSURE: 137 MMHG | OXYGEN SATURATION: 95 % | HEART RATE: 68 BPM | WEIGHT: 202 LBS | BODY MASS INDEX: 29.92 KG/M2 | HEIGHT: 69 IN | DIASTOLIC BLOOD PRESSURE: 73 MMHG

## 2023-10-03 DIAGNOSIS — I67.1 CEREBRAL ANEURYSM, NONRUPTURED: Primary | ICD-10-CM

## 2023-10-03 PROCEDURE — 99214 OFFICE O/P EST MOD 30 MIN: CPT | Mod: GC | Performed by: NEUROLOGICAL SURGERY

## 2023-10-03 ASSESSMENT — PAIN SCALES - GENERAL: PAINLEVEL: NO PAIN (0)

## 2023-10-03 NOTE — PATIENT INSTRUCTIONS
Follow up with Dr Cannon in 1 year with CT Angiogram Head with contrast.  Call Cindy RN  Neurosurgery Care Coordinator with questions/concerns     Thank you for using [facility]

## 2023-10-03 NOTE — PROGRESS NOTES
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 We performed a diagnostic cerebral angiogram to better evaluate the lesion. He is doing well post-angiogram and has no current symptoms or complaints.      Past Medical History:  Past Medical History:   Diagnosis Date     Arthritis      COPD (chronic obstructive pulmonary disease) (H)      Depression      High cholesterol      High cholesterol      Hypertension      Hypertension      PTSD (post-traumatic stress disorder)      PTSD (post-traumatic stress disorder)     combat medic in [place]     PTSD (post-traumatic stress disorder)      Sleep apnea        Past Surgical History:  Past Surgical History:   Procedure Laterality Date     APPENDECTOMY       ARTHROPLASTY REVISION HIP Right 7/16/2020    Procedure: RIGHT HIP VEGA RESURFACING EXPLANT AND PLACEMENT PROSTALAC RIGHT HIP;  Surgeon: Oli Burns MD;  Location: [facility] OR;  Service: Orthopedics     ARTHROPLASTY REVISION HIP Right 9/1/2020    Procedure: RIGHT TOTAL HIP ARTHROPLASTY RE-IMPLANTATION;  Surgeon: Oli Burns MD;  Location: [facility] OR;  Service: Orthopedics     BACK SURGERY       HERNIA REPAIR  2017     JOINT REPLACEMENT Right     hip     LAMINECTOMY AND MICRODISCECTOMY LUMBAR SPINE      L5-S1     ORTHOPEDIC SURGERY      hip surgery     ORTHOPEDIC SURGERY      back surgery x2     RESURFING HIP Right 2013 [place]       Social History:  Social History     Socioeconomic History     Marital status:      Spouse name: Not on file     Number of children: Not on file     Years of  "education: Not on file     Highest education level: Not on file   Occupational History     Not on file   Tobacco Use     Smoking status: Never     Smokeless tobacco: Never   Substance and Sexual Activity     Alcohol use: Yes     Alcohol/week: 3.0 standard drinks of alcohol     Comment: rarely     Drug use: Never     Sexual activity: Not on file   Other Topics Concern     Parent/sibling w/ CABG, MI or angioplasty before 65F 55M? Not Asked   Social History Narrative     Not on file     Social Determinants of Health     Financial Resource Strain: Not on file   Food Insecurity: Not on file   Transportation Needs: Not on file   Physical Activity: Not on file   Stress: Not on file   Social Connections: Not on file   Interpersonal Safety: Not on file   Housing Stability: Not on file       Family History:  No family history on file.    Home Medications:  Current Outpatient Medications   Medication     acetaminophen (TYLENOL) 500 MG tablet     albuterol (PROVENTIL) (2.5 MG/3ML) 0.083% neb solution     ASPIRIN PO     ATENOLOL     budesonide (PULMICORT FLEXHALER) 180 MCG/ACT inhaler     cholecalciferol (VITAMIN D3) 25 mcg (1000 units) capsule     hydrochlorothiazide (MICROZIDE) 12.5 MG capsule     HYDROcodone-acetaminophen (NORCO) 5-325 MG per tablet     hydrOXYzine (VISTARIL) 25 MG capsule     lisinopril (ZESTRIL) 40 MG tablet     metoprolol tartrate (LOPRESSOR) 25 MG tablet     SIMVASTATIN PO     TRAZODONE HCL PO     venlafaxine (EFFEXOR XR) 150 MG 24 hr capsule     No current facility-administered medications for this visit.       Allergies:  Allergies   Allergen Reactions     Percocet [Oxycodone-Acetaminophen]        Physical Examination:  Vitals: /73   Pulse 68   Ht 1.753 m (5' 9\")   Wt 91.6 kg (202 lb)   SpO2 95%   BMI 29.83 kg/m    General: Adult male patient, seated in a chair, NAD  HEENT: NC/AT, no icterus, op pink and moist  Cardiac: RRR  Chest: non-labored on RA  Abdomen: S/NT/ND  Extremities: Warm, no " edema  Skin: No rash or lesion   Psych: Mood pleasant, affect congruent  Neuro:  Mental status: Awake, alert, attentive, oriented to self, time, place, and circumstance. Language is fluent and coherent with intact comprehension of complex commands, naming and repetition.  Cranial nerves: VFF, PERRL, conjugate gaze, EOMI, facial sensation intact, face symmetric, shoulder shrug strong, tongue/uvula midline, no dysarthria.   Motor: Normal bulk and tone. No abnormal movements. 5/5 strength in 4/4 extremities.   Sensory: Intact to light touch in 4/4 extremities  Coordination: FNF and HS without ataxia or dysmetria.  Gait: Normal width, stride length, turn, and arm swing. Station normal.       Laboratory findings:  Reviewed    Imaging findings:  Diagnostic cerebral angiogram reviewed with the patient. 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 We suspect rupture risk is lower than the 2.4% 5 year risk as quoted by [facility]. That said, endovascular treatment is not optimal as this lesion would require flow diversion and the proximal stent would necessarily protrude into the ICA and put the entire hemisphere at risk of stroke in the case of in-stent thrombosis. Surgical clipping is also high risk due to the presence of  artery adjacent to or intrinsic to the aneurysm itself. For these reasons (lower than typical rupture risk and higher than typical treatment risk by either modality) we advise close monitoring at this time. Mr. York understands our rationale and is in agreement.    Plan:  Repeat CTA head in 1 year  Clinic after    Patient seen and discussed with the attending, Dr. Cannon.    Lluvia Duval MD  Endovascular Surgical Neuroradiology Fellow, PGY-7  [phone]    I  have seen the patient along with Dr. Duval and agree with the assessment and plan.

## 2023-10-03 NOTE — LETTER
10/3/2023       RE: Alex York  [address]  [place] [address]     Dear Colleague,    Thank you for referring your patient, Alex York, to the [facility]UROSURGERY [facility]IN[facility] [place] [facility]. Please see a copy of my visit note below.          [facility] [place]  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 We performed a diagnostic cerebral angiogram to better evaluate the lesion. He is doing well post-angiogram and has no current symptoms or complaints.      Past Medical History:  Past Medical History:   Diagnosis Date    Arthritis     COPD (chronic obstructive pulmonary disease) (H)     Depression     High cholesterol     High cholesterol     Hypertension     Hypertension     PTSD (post-traumatic stress disorder)     PTSD (post-traumatic stress disorder)     combat medic in [place]    PTSD (post-traumatic stress disorder)     Sleep apnea        Past Surgical History:  Past Surgical History:   Procedure Laterality Date    APPENDECTOMY      ARTHROPLASTY REVISION HIP Right 7/16/2020    Procedure: RIGHT HIP VEGA RESURFACING EXPLANT AND PLACEMENT PROSTALAC RIGHT HIP;  Surgeon: Oli Burns MD;  Location: [facility];  Service: Orthopedics    ARTHROPLASTY REVISION HIP Right 9/1/2020    Procedure: RIGHT TOTAL HIP ARTHROPLASTY RE-IMPLANTATION;  Surgeon: Oli Burns MD;  Location: [facility] OR;  Service: Orthopedics    BACK SURGERY      HERNIA REPAIR  2017    JOINT REPLACEMENT Right     hip    LAMINECTOMY AND MICRODISCECTOMY LUMBAR SPINE       L5-S1    ORTHOPEDIC SURGERY      hip surgery    ORTHOPEDIC SURGERY      back surgery x2    RESURFING HIP Right 2013    [place]       Social History:  Social History     Socioeconomic History    Marital status:      Spouse name: Not on file    Number of children: Not on file    Years of education: Not on file    Highest education level: Not on file   Occupational History    Not on file   Tobacco Use    Smoking status: Never    Smokeless tobacco: Never   Substance and Sexual Activity    Alcohol use: Yes     Alcohol/week: 3.0 standard drinks of alcohol     Comment: rarely    Drug use: Never    Sexual activity: Not on file   Other Topics Concern    Parent/sibling w/ CABG, MI or angioplasty before 65F 55M? Not Asked   Social History Narrative    Not on file     Social Determinants of Health     Financial Resource Strain: Not on file   Food Insecurity: Not on file   Transportation Needs: Not on file   Physical Activity: Not on file   Stress: Not on file   Social Connections: Not on file   Interpersonal Safety: Not on file   Housing Stability: Not on file       Family History:  No family history on file.    Home Medications:  Current Outpatient Medications   Medication    acetaminophen (TYLENOL) 500 MG tablet    albuterol (PROVENTIL) (2.5 MG/3ML) 0.083% neb solution    ASPIRIN PO    ATENOLOL    budesonide (PULMICORT FLEXHALER) 180 MCG/ACT inhaler    cholecalciferol (VITAMIN D3) 25 mcg (1000 units) capsule    hydrochlorothiazide (MICROZIDE) 12.5 MG capsule    HYDROcodone-acetaminophen (NORCO) 5-325 MG per tablet    hydrOXYzine (VISTARIL) 25 MG capsule    lisinopril (ZESTRIL) 40 MG tablet    metoprolol tartrate (LOPRESSOR) 25 MG tablet    SIMVASTATIN PO    TRAZODONE HCL PO    venlafaxine (EFFEXOR XR) 150 MG 24 hr capsule     No current facility-administered medications for this visit.       Allergies:  Allergies   Allergen Reactions    Percocet [Oxycodone-Acetaminophen]        Physical Examination:  Vitals: BP  "137/73   Pulse 68   Ht 1.753 m (5' 9\")   Wt 91.6 kg (202 lb)   SpO2 95%   BMI 29.83 kg/m    General: Adult male patient, seated in a chair, NAD  HEENT: NC/AT, no icterus, op pink and moist  Cardiac: RRR  Chest: non-labored on RA  Abdomen: S/NT/ND  Extremities: Warm, no edema  Skin: No rash or lesion   Psych: Mood pleasant, affect congruent  Neuro:  Mental status: Awake, alert, attentive, oriented to self, time, place, and circumstance. Language is fluent and coherent with intact comprehension of complex commands, naming and repetition.  Cranial nerves: VFF, PERRL, conjugate gaze, EOMI, facial sensation intact, face symmetric, shoulder shrug strong, tongue/uvula midline, no dysarthria.   Motor: Normal bulk and tone. No abnormal movements. 5/5 strength in 4/4 extremities.   Sensory: Intact to light touch in 4/4 extremities  Coordination: FNF and HS without ataxia or dysmetria.  Gait: Normal width, stride length, turn, and arm swing. Station normal.       Laboratory findings:  Reviewed    Imaging findings:  Diagnostic cerebral angiogram reviewed with the patient. 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 We suspect rupture risk is lower than the 2.4% 5 year risk as quoted by [facility]. That said, endovascular treatment is not optimal as this lesion would require flow diversion and the proximal stent would necessarily protrude into the ICA and put the entire hemisphere at risk of stroke in the case of in-stent thrombosis. Surgical clipping is also high risk due to the presence of  artery adjacent to or intrinsic to the aneurysm itself. For these reasons (lower than typical rupture risk and higher than typical treatment risk by either modality) we advise close monitoring at " this time. Mr. York understands our rationale and is in agreement.    Plan:  Repeat CTA head in 1 year  Clinic after    Patient seen and discussed with the attending, Dr. Cannon.        Again, thank you for allowing me to participate in the care of your patient.      Sincerely,    Abilio Cannon MD

## 2023-10-03 NOTE — NURSING NOTE
Chief Complaint   Patient presents with    RECHECK     Angio gram results and health care plan   /73   Pulse 68   SpO2 95%     Helena Lu CMA at 10:24 AM on 10/3/2023.

## 2023-11-04 ENCOUNTER — HEALTH MAINTENANCE LETTER (OUTPATIENT)
Age: 78
End: 2023-11-04

## 2024-03-14 ENCOUNTER — TRANSCRIBE ORDERS (OUTPATIENT)
Dept: OTHER | Age: 79
End: 2024-03-14

## 2024-03-14 DIAGNOSIS — I67.1 CEREBRAL ANEURYSM, NONRUPTURED: Primary | ICD-10-CM

## 2024-08-30 ENCOUNTER — ANCILLARY PROCEDURE (OUTPATIENT)
Dept: CT IMAGING | Facility: CLINIC | Age: 79
End: 2024-08-30
Attending: NEUROLOGICAL SURGERY
Payer: COMMERCIAL

## 2024-08-30 DIAGNOSIS — I67.1 CEREBRAL ANEURYSM, NONRUPTURED: ICD-10-CM

## 2024-08-30 LAB
CREAT BLD-MCNC: 1.2 MG/DL (ref 0.7–1.3)
EGFRCR SERPLBLD CKD-EPI 2021: >60 ML/MIN/1.73M2

## 2024-08-30 PROCEDURE — 70496 CT ANGIOGRAPHY HEAD: CPT | Mod: GC | Performed by: RADIOLOGY

## 2024-08-30 PROCEDURE — 82565 ASSAY OF CREATININE: CPT | Performed by: PATHOLOGY

## 2024-08-30 RX ORDER — IOPAMIDOL 755 MG/ML
70 INJECTION, SOLUTION INTRAVASCULAR ONCE
Status: COMPLETED | OUTPATIENT
Start: 2024-08-30 | End: 2024-08-30

## 2024-08-30 RX ADMIN — IOPAMIDOL 70 ML: 755 INJECTION, SOLUTION INTRAVASCULAR at 06:58

## 2024-08-30 NOTE — DISCHARGE INSTRUCTIONS

## 2024-09-03 ENCOUNTER — OFFICE VISIT (OUTPATIENT)
Dept: NEUROSURGERY | Facility: CLINIC | Age: 79
End: 2024-09-03
Payer: COMMERCIAL

## 2024-09-03 VITALS
BODY MASS INDEX: 29.77 KG/M2 | OXYGEN SATURATION: 94 % | HEIGHT: 69 IN | HEART RATE: 92 BPM | WEIGHT: 201 LBS | SYSTOLIC BLOOD PRESSURE: 159 MMHG | DIASTOLIC BLOOD PRESSURE: 80 MMHG

## 2024-09-03 DIAGNOSIS — I67.1 CEREBRAL ANEURYSM, NONRUPTURED: ICD-10-CM

## 2024-09-03 PROCEDURE — 99213 OFFICE O/P EST LOW 20 MIN: CPT | Mod: GC | Performed by: NEUROLOGICAL SURGERY

## 2024-09-03 ASSESSMENT — PAIN SCALES - GENERAL: PAINLEVEL: NO PAIN (0)

## 2024-09-03 NOTE — PATIENT INSTRUCTIONS
Follow up with Dr Cannon in 1 year with CTA Head with contrast prior.    CTA Head with contrast Order entered.    Call Cindy RN  Neurosurgery Care Coordinator with questions/concerns     Thank you for using [facility]

## 2024-09-03 NOTE — LETTER
"9/3/2024       RE: Alex York  [address]  [place] [address]     Dear Colleague,    Thank you for referring your patient, Alex York, to the [facility] NEUROSURGERY CLINIC [place] at [facility]. Please see a copy of my visit note below.    9/3/2024  Neuroendovascular Clinic Visit    Chief Complaint: Left PCoA Aneurysm follow up    History of present illness:  Alex York is a 79 year old male presenting with a PMHx of hypertension, PTSD, COPD, and hypercholesterolemia. Patient had a CTA head and neck done prior to stellate ganglion nerve block for PTSD, and was incidentally found to have a Left Pcomm aneurysm.  Of note, patient has no family history of aneurysms.    He underwent a diagnostic cervico cerebral angiogram on 9/25/2023, and now presents for follow up evaluation, with review of repeat imaging.  Today, he reports occasional mild headaches likely attributed to caffeine withdrawal, improved with tylenol. No complaints otherwise, no visual abnormalities or focal neurologic deficits.    Physical exam:   BP (!) 159/80 (BP Location: Right arm, Patient Position: Sitting, Cuff Size: Adult Regular)   Pulse 92   Ht 1.753 m (5' 9\")   Wt 91.2 kg (201 lb)   SpO2 94%   BMI 29.68 kg/m    General: Awake and alert and in no acute distress.  Pulm: Breathing comfortably on room air  Neurologic:  - Face symmetric  - Pupils reactive, extraocular movements intact  - Full strength in upper and lower extremities  - Intact sensation to all extremities  - No clonus, patellar and bicep reflexes 2+  - Intact coordination    Imaging:  CTA HEAD WITH CONTRAST 8/30/2024 7:12 AM   Pertinent reads:  Saccular aneurysm projecting superiorly from the proximal left  posterior communicating artery measuring approximately 6 mm apex to  base, 6 mm across the dome and at least 5 mm across the neck is  unchanged compared to prior CT angiogram (series 6, image " 246 and  series 902 image 53). 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 Dominant right vertebral artery, with hypoplastic appearance  of the left vertebral artery without obvious signs of stenosis.                                                                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       Assessment:  Alex Yrok is a 79 year old male presenting with a PMHx of hypertension, PTSD, COPD, and hypercholesterolemia. Incidentally found to have a Left Pcomm aneurysm. Patient is still asymptomatic of same, and repeat imaging shows that aneurysm is grossly unchanged from previous imaging.   Patient advised that the location of the aneurysm poses a higher risk for endovascular or surgical intervention. Since the size is largely unchanged from previous imaging, we will continue surveillance.      Plan:  - CT Angiogram in 1 year  - Follow up in 1 year    Patient seen and discussed with Neuroendovascular Staff, Dr. Davis MD.  Approximately 35 minutes were spent in chart and image review, evaluation of the patient and assessment of next steps.    Mario Chiang MD, PGY-1  Department of Neurosurgery  Pager: [phone]    Please contact neurosurgery resident on call with questions.    Dial * * *[phone], enter [phone] when prompted.       REVIEWED ASSOCIATED CLINICAL DATA AND HISTORY FOUND IN THE MEDICAL RECORD:  Past Medical History:   Past Medical History:   Diagnosis Date     Arthritis      COPD (chronic obstructive pulmonary disease) (H)      Depression      High cholesterol      High cholesterol      Hypertension      Hypertension      PTSD (post-traumatic stress disorder)      PTSD (post-traumatic stress disorder)     combat  medic in [place]     PTSD (post-traumatic stress disorder)      Sleep apnea        Surgical History:   Past Surgical History:   Procedure Laterality Date     APPENDECTOMY       ARTHROPLASTY REVISION HIP Right 7/16/2020    Procedure: RIGHT HIP VEGA RESURFACING EXPLANT AND PLACEMENT PROSTALAC RIGHT HIP;  Surgeon: Oli Burns MD;  Location: [facility] OR;  Service: Orthopedics     ARTHROPLASTY REVISION HIP Right 9/1/2020    Procedure: RIGHT TOTAL HIP ARTHROPLASTY RE-IMPLANTATION;  Surgeon: Oli Burns MD;  Location: [facility] OR;  Service: Orthopedics     BACK SURGERY       HERNIA REPAIR  2017     IR CAROTID CEREBRAL ANGIOGRAM BILATERAL  9/25/2023     JOINT REPLACEMENT Right     hip     LAMINECTOMY AND MICRODISCECTOMY LUMBAR SPINE      L5-S1     ORTHOPEDIC SURGERY      hip surgery     ORTHOPEDIC SURGERY      back surgery x2     RESURFING HIP Right 2013    [place]       Social history:   Social History     Tobacco Use     Smoking status: Never     Smokeless tobacco: Never   Substance Use Topics     Alcohol use: Yes     Alcohol/week: 3.0 standard drinks of alcohol     Comment: rarely     Drug use: Never       Family history:   No family history on file.    Medications:  Current Outpatient Medications   Medication Sig Dispense Refill     acetaminophen (TYLENOL) 500 MG tablet Take 500-1,000 mg by mouth every 6 hours as needed for mild pain       albuterol (PROVENTIL) (2.5 MG/3ML) 0.083% neb solution Take 2.5 mg by nebulization every 6 hours as needed for shortness of breath, wheezing or cough       budesonide (PULMICORT FLEXHALER) 180 MCG/ACT inhaler Inhale into the lungs 2 times daily       cholecalciferol (VITAMIN D3) 25 mcg (1000 units) capsule Take 1 capsule by mouth daily       hydrOXYzine (VISTARIL) 25 MG capsule Take 50 mg by mouth 3 times daily as needed for itching       lisinopril (ZESTRIL) 40 MG tablet Take 40 mg by mouth daily       metoprolol tartrate (LOPRESSOR) 25 MG tablet Take  25 mg by mouth 2 times daily       TRAZODONE HCL PO Take 150 mg by mouth At Bedtime       venlafaxine (EFFEXOR XR) 150 MG 24 hr capsule Take 150 mg by mouth daily       ASPIRIN PO Take 81 mg by mouth (Patient not taking: Reported on 7/25/2023)       ATENOLOL        hydrochlorothiazide (MICROZIDE) 12.5 MG capsule Take 12.5 mg by mouth daily       HYDROcodone-acetaminophen (NORCO) 5-325 MG per tablet Take 1-2 tablets by mouth every 4 hours as needed for moderate to severe pain 20 tablet 0     SIMVASTATIN PO Take 80 mg by mouth At Bedtime       No current facility-administered medications for this visit.       Allergies:     Allergies   Allergen Reactions     Percocet [Oxycodone-Acetaminophen]    Again, thank you for allowing me to participate in the care of your patient.      Sincerely,    Abilio Cannon MD

## 2024-09-03 NOTE — PROGRESS NOTES
"9/3/2024  Neuroendovascular Clinic Visit    Chief Complaint: Left PCoA Aneurysm follow up    History of present illness:  Alex York is a 79 year old male presenting with a PMHx of hypertension, PTSD, COPD, and hypercholesterolemia. Patient had a CTA head and neck done prior to stellate ganglion nerve block for PTSD, and was incidentally found to have a Left Pcomm aneurysm.  Of note, patient has no family history of aneurysms.    He underwent a diagnostic cervico cerebral angiogram on 9/25/2023, and now presents for follow up evaluation, with review of repeat imaging.  Today, he reports occasional mild headaches likely attributed to caffeine withdrawal, improved with tylenol. No complaints otherwise, no visual abnormalities or focal neurologic deficits.    Physical exam:   BP (!) 159/80 (BP Location: Right arm, Patient Position: Sitting, Cuff Size: Adult Regular)   Pulse 92   Ht 1.753 m (5' 9\")   Wt 91.2 kg (201 lb)   SpO2 94%   BMI 29.68 kg/m    General: Awake and alert and in no acute distress.  Pulm: Breathing comfortably on room air  Neurologic:  - Face symmetric  - Pupils reactive, extraocular movements intact  - Full strength in upper and lower extremities  - Intact sensation to all extremities  - No clonus, patellar and bicep reflexes 2+  - Intact coordination    Imaging:  CTA HEAD WITH CONTRAST 8/30/2024 7:12 AM   Pertinent reads:  Saccular aneurysm projecting superiorly from the proximal left  posterior communicating artery measuring approximately 6 mm apex to  base, 6 mm across the dome and at least 5 mm across the neck is  unchanged compared to prior CT angiogram (series 6, image 246 and  series 902 image 53). 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 Dominant right vertebral artery, with hypoplastic appearance  of the left vertebral artery " without obvious signs of stenosis.                                                                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       Assessment:  Alex York is a 79 year old male presenting with a PMHx of hypertension, PTSD, COPD, and hypercholesterolemia. Incidentally found to have a Left Pcomm aneurysm. Patient is still asymptomatic of same, and repeat imaging shows that aneurysm is grossly unchanged from previous imaging.   Patient advised that the location of the aneurysm poses a higher risk for endovascular or surgical intervention. Since the size is largely unchanged from previous imaging, we will continue surveillance.      Plan:  - CT Angiogram in 1 year  - Follow up in 1 year    Patient seen and discussed with Neuroendovascular Staff, Dr. Davis MD.  Approximately 35 minutes were spent in chart and image review, evaluation of the patient and assessment of next steps.    Mario Chiang MD, PGY-1  Department of Neurosurgery  Pager: [phone]    Please contact neurosurgery resident on call with questions.    Dial * * [phone], enter [phone] when prompted.       REVIEWED ASSOCIATED CLINICAL DATA AND HISTORY FOUND IN THE MEDICAL RECORD:  Past Medical History:   Past Medical History:   Diagnosis Date    Arthritis     COPD (chronic obstructive pulmonary disease) (H)     Depression     High cholesterol     High cholesterol     Hypertension     Hypertension     PTSD (post-traumatic stress disorder)     PTSD (post-traumatic stress disorder)     combat medic in [place]    PTSD (post-traumatic stress disorder)     Sleep apnea        Surgical History:   Past Surgical History:   Procedure Laterality Date    APPENDECTOMY      ARTHROPLASTY REVISION HIP Right 7/16/2020    Procedure: RIGHT HIP VEGA RESURFACING EXPLANT AND PLACEMENT PROSTALAC RIGHT HIP;  Surgeon: Katy  Oli MOYER MD;  Location: [facility] OR;  Service: Orthopedics    ARTHROPLASTY REVISION HIP Right 9/1/2020    Procedure: RIGHT TOTAL HIP ARTHROPLASTY RE-IMPLANTATION;  Surgeon: Oli Burns MD;  Location: [facility] OR;  Service: Orthopedics    BACK SURGERY      HERNIA REPAIR  2017    IR CAROTID CEREBRAL ANGIOGRAM BILATERAL  9/25/2023    JOINT REPLACEMENT Right     hip    LAMINECTOMY AND MICRODISCECTOMY LUMBAR SPINE      L5-S1    ORTHOPEDIC SURGERY      hip surgery    ORTHOPEDIC SURGERY      back surgery x2    RESURFING HIP Right 2013    [employer]       Social history:   Social History     Tobacco Use    Smoking status: Never    Smokeless tobacco: Never   Substance Use Topics    Alcohol use: Yes     Alcohol/week: 3.0 standard drinks of alcohol     Comment: rarely    Drug use: Never       Family history:   No family history on file.    Medications:  Current Outpatient Medications   Medication Sig Dispense Refill    acetaminophen (TYLENOL) 500 MG tablet Take 500-1,000 mg by mouth every 6 hours as needed for mild pain      albuterol (PROVENTIL) (2.5 MG/3ML) 0.083% neb solution Take 2.5 mg by nebulization every 6 hours as needed for shortness of breath, wheezing or cough      budesonide (PULMICORT FLEXHALER) 180 MCG/ACT inhaler Inhale into the lungs 2 times daily      cholecalciferol (VITAMIN D3) 25 mcg (1000 units) capsule Take 1 capsule by mouth daily      hydrOXYzine (VISTARIL) 25 MG capsule Take 50 mg by mouth 3 times daily as needed for itching      lisinopril (ZESTRIL) 40 MG tablet Take 40 mg by mouth daily      metoprolol tartrate (LOPRESSOR) 25 MG tablet Take 25 mg by mouth 2 times daily      TRAZODONE HCL PO Take 150 mg by mouth At Bedtime      venlafaxine (EFFEXOR XR) 150 MG 24 hr capsule Take 150 mg by mouth daily      ASPIRIN PO Take 81 mg by mouth (Patient not taking: Reported on 7/25/2023)      ATENOLOL       hydrochlorothiazide (MICROZIDE) 12.5 MG capsule Take 12.5 mg by mouth daily       HYDROcodone-acetaminophen (NORCO) 5-325 MG per tablet Take 1-2 tablets by mouth every 4 hours as needed for moderate to severe pain 20 tablet 0    SIMVASTATIN PO Take 80 mg by mouth At Bedtime       No current facility-administered medications for this visit.       Allergies:     Allergies   Allergen Reactions    Percocet [Oxycodone-Acetaminophen]

## 2024-12-22 ENCOUNTER — HEALTH MAINTENANCE LETTER (OUTPATIENT)
Age: 79
End: 2024-12-22

## 2025-08-23 ENCOUNTER — TRANSFERRED RECORDS (OUTPATIENT)
Dept: HEALTH INFORMATION MANAGEMENT | Facility: CLINIC | Age: 80
End: 2025-08-23
Payer: MEDICARE

## 2025-08-25 ENCOUNTER — TELEPHONE (OUTPATIENT)
Dept: NEUROSURGERY | Facility: CLINIC | Age: 80
End: 2025-08-25
Payer: MEDICARE

## 2025-08-25 ENCOUNTER — TRANSCRIBE ORDERS (OUTPATIENT)
Dept: OTHER | Age: 80
End: 2025-08-25

## 2025-08-25 DIAGNOSIS — I67.1 CEREBRAL ANEURYSM, NONRUPTURED: Primary | ICD-10-CM

## 2025-09-04 ENCOUNTER — VIRTUAL VISIT (OUTPATIENT)
Dept: NEUROSURGERY | Facility: CLINIC | Age: 80
End: 2025-09-04
Attending: NEUROLOGICAL SURGERY
Payer: COMMERCIAL

## 2025-09-04 VITALS — WEIGHT: 174 LBS | BODY MASS INDEX: 24.91 KG/M2 | HEIGHT: 70 IN

## 2025-09-04 DIAGNOSIS — I67.1 CEREBRAL ANEURYSM, NONRUPTURED: Primary | ICD-10-CM

## 2025-09-04 ASSESSMENT — PAIN SCALES - GENERAL: PAINLEVEL_OUTOF10: MODERATE PAIN (4)

## (undated) RX ORDER — LIDOCAINE HYDROCHLORIDE 10 MG/ML
INJECTION, SOLUTION EPIDURAL; INFILTRATION; INTRACAUDAL; PERINEURAL
Status: DISPENSED
Start: 2023-09-25

## (undated) RX ORDER — KETOROLAC TROMETHAMINE 30 MG/ML
INJECTION, SOLUTION INTRAMUSCULAR; INTRAVENOUS
Status: DISPENSED
Start: 2023-09-25

## (undated) RX ORDER — FENTANYL CITRATE 50 UG/ML
INJECTION, SOLUTION INTRAMUSCULAR; INTRAVENOUS
Status: DISPENSED
Start: 2023-09-25

## (undated) RX ORDER — SODIUM CHLORIDE 9 MG/ML
INJECTION, SOLUTION INTRAVENOUS
Status: DISPENSED
Start: 2023-09-25